# Patient Record
Sex: FEMALE | Race: WHITE | NOT HISPANIC OR LATINO | Employment: PART TIME | URBAN - METROPOLITAN AREA
[De-identification: names, ages, dates, MRNs, and addresses within clinical notes are randomized per-mention and may not be internally consistent; named-entity substitution may affect disease eponyms.]

---

## 2022-10-11 ENCOUNTER — APPOINTMENT (EMERGENCY)
Dept: CT IMAGING | Facility: HOSPITAL | Age: 67
DRG: 244 | End: 2022-10-11
Payer: MEDICARE

## 2022-10-11 ENCOUNTER — APPOINTMENT (OUTPATIENT)
Dept: RADIOLOGY | Facility: HOSPITAL | Age: 67
DRG: 244 | End: 2022-10-11
Payer: MEDICARE

## 2022-10-11 ENCOUNTER — HOSPITAL ENCOUNTER (EMERGENCY)
Facility: HOSPITAL | Age: 67
DRG: 244 | End: 2022-10-11
Attending: EMERGENCY MEDICINE
Payer: MEDICARE

## 2022-10-11 ENCOUNTER — HOSPITAL ENCOUNTER (INPATIENT)
Facility: HOSPITAL | Age: 67
LOS: 2 days | Discharge: HOME | DRG: 244 | End: 2022-10-13
Attending: STUDENT IN AN ORGANIZED HEALTH CARE EDUCATION/TRAINING PROGRAM | Admitting: STUDENT IN AN ORGANIZED HEALTH CARE EDUCATION/TRAINING PROGRAM
Payer: MEDICARE

## 2022-10-11 VITALS
HEART RATE: 28 BPM | OXYGEN SATURATION: 96 % | DIASTOLIC BLOOD PRESSURE: 67 MMHG | HEIGHT: 62 IN | BODY MASS INDEX: 37.2 KG/M2 | WEIGHT: 202.16 LBS | TEMPERATURE: 98.2 F | SYSTOLIC BLOOD PRESSURE: 147 MMHG | RESPIRATION RATE: 18 BRPM

## 2022-10-11 DIAGNOSIS — I44.2 HEART BLOCK AV COMPLETE (HCC): Primary | ICD-10-CM

## 2022-10-11 DIAGNOSIS — M25.562 LEFT KNEE PAIN: ICD-10-CM
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
## 2022-10-11 DIAGNOSIS — I44.2 COMPLETE HEART BLOCK, TRANSIENT (HCC): ICD-10-CM

## 2022-10-11 DIAGNOSIS — R07.81 RIB PAIN ON LEFT SIDE: ICD-10-CM

## 2022-10-11 DIAGNOSIS — I44.2 COMPLETE HEART BLOCK (HCC): Primary | ICD-10-CM

## 2022-10-11 PROBLEM — R74.01 TRANSAMINITIS: Status: ACTIVE | Noted: 2022-10-11

## 2022-10-11 PROBLEM — R79.89 ELEVATED LACTIC ACID LEVEL: Status: ACTIVE | Noted: 2022-10-11

## 2022-10-11 LAB
2HR DELTA HS TROPONIN: 2 NG/L
ALBUMIN SERPL BCP-MCNC: 3.5 G/DL (ref 3.5–5)
ALBUMIN SERPL BCP-MCNC: 4.2 G/DL (ref 3.5–5)
ALP SERPL-CCNC: 75 U/L (ref 46–116)
ALP SERPL-CCNC: 99 U/L (ref 46–116)
ALT SERPL W P-5'-P-CCNC: 106 U/L (ref 12–78)
ALT SERPL W P-5'-P-CCNC: 132 U/L (ref 12–78)
ANION GAP SERPL CALCULATED.3IONS-SCNC: 5 MMOL/L (ref 4–13)
ANION GAP SERPL CALCULATED.3IONS-SCNC: 9 MMOL/L (ref 4–13)
AST SERPL W P-5'-P-CCNC: 68 U/L (ref 5–45)
AST SERPL W P-5'-P-CCNC: 86 U/L (ref 5–45)
BASOPHILS # BLD AUTO: 0.05 THOUSANDS/ÂΜL (ref 0–0.1)
BASOPHILS # BLD AUTO: 0.06 THOUSANDS/ÂΜL (ref 0–0.1)
BASOPHILS NFR BLD AUTO: 1 % (ref 0–1)
BASOPHILS NFR BLD AUTO: 1 % (ref 0–1)
BILIRUB SERPL-MCNC: 0.89 MG/DL (ref 0.2–1)
BILIRUB SERPL-MCNC: 1.14 MG/DL (ref 0.2–1)
BUN SERPL-MCNC: 22 MG/DL (ref 5–25)
BUN SERPL-MCNC: 23 MG/DL (ref 5–25)
CA-I BLD-SCNC: 1.18 MMOL/L (ref 1.12–1.32)
CALCIUM SERPL-MCNC: 10.3 MG/DL (ref 8.3–10.1)
CALCIUM SERPL-MCNC: 9.1 MG/DL (ref 8.3–10.1)
CARDIAC TROPONIN I PNL SERPL HS: 28 NG/L
CARDIAC TROPONIN I PNL SERPL HS: 30 NG/L
CHLORIDE SERPL-SCNC: 102 MMOL/L (ref 96–108)
CHLORIDE SERPL-SCNC: 111 MMOL/L (ref 96–108)
CO2 SERPL-SCNC: 22 MMOL/L (ref 21–32)
CO2 SERPL-SCNC: 27 MMOL/L (ref 21–32)
CREAT SERPL-MCNC: 0.86 MG/DL (ref 0.6–1.3)
CREAT SERPL-MCNC: 0.95 MG/DL (ref 0.6–1.3)
EOSINOPHIL # BLD AUTO: 0.11 THOUSAND/ÂΜL (ref 0–0.61)
EOSINOPHIL # BLD AUTO: 0.15 THOUSAND/ÂΜL (ref 0–0.61)
EOSINOPHIL NFR BLD AUTO: 1 % (ref 0–6)
EOSINOPHIL NFR BLD AUTO: 2 % (ref 0–6)
ERYTHROCYTE [DISTWIDTH] IN BLOOD BY AUTOMATED COUNT: 13.3 % (ref 11.6–15.1)
ERYTHROCYTE [DISTWIDTH] IN BLOOD BY AUTOMATED COUNT: 13.4 % (ref 11.6–15.1)
GFR SERPL CREATININE-BSD FRML MDRD: 61 ML/MIN/1.73SQ M
GFR SERPL CREATININE-BSD FRML MDRD: 69 ML/MIN/1.73SQ M
GLUCOSE SERPL-MCNC: 150 MG/DL (ref 65–140)
GLUCOSE SERPL-MCNC: 184 MG/DL (ref 65–140)
HCT VFR BLD AUTO: 40.1 % (ref 34.8–46.1)
HCT VFR BLD AUTO: 40.7 % (ref 34.8–46.1)
HGB BLD-MCNC: 13.1 G/DL (ref 11.5–15.4)
HGB BLD-MCNC: 13.5 G/DL (ref 11.5–15.4)
IMM GRANULOCYTES # BLD AUTO: 0.03 THOUSAND/UL (ref 0–0.2)
IMM GRANULOCYTES # BLD AUTO: 0.03 THOUSAND/UL (ref 0–0.2)
IMM GRANULOCYTES NFR BLD AUTO: 0 % (ref 0–2)
IMM GRANULOCYTES NFR BLD AUTO: 1 % (ref 0–2)
LACTATE SERPL-SCNC: 1.9 MMOL/L (ref 0.5–2)
LACTATE SERPL-SCNC: 2.2 MMOL/L (ref 0.5–2)
LYMPHOCYTES # BLD AUTO: 2.02 THOUSANDS/ÂΜL (ref 0.6–4.47)
LYMPHOCYTES # BLD AUTO: 2.4 THOUSANDS/ÂΜL (ref 0.6–4.47)
LYMPHOCYTES NFR BLD AUTO: 23 % (ref 14–44)
LYMPHOCYTES NFR BLD AUTO: 37 % (ref 14–44)
MAGNESIUM SERPL-MCNC: 1.9 MG/DL (ref 1.6–2.6)
MCH RBC QN AUTO: 30 PG (ref 26.8–34.3)
MCH RBC QN AUTO: 30.1 PG (ref 26.8–34.3)
MCHC RBC AUTO-ENTMCNC: 32.7 G/DL (ref 31.4–37.4)
MCHC RBC AUTO-ENTMCNC: 33.2 G/DL (ref 31.4–37.4)
MCV RBC AUTO: 91 FL (ref 82–98)
MCV RBC AUTO: 92 FL (ref 82–98)
MONOCYTES # BLD AUTO: 0.53 THOUSAND/ÂΜL (ref 0.17–1.22)
MONOCYTES # BLD AUTO: 0.57 THOUSAND/ÂΜL (ref 0.17–1.22)
MONOCYTES NFR BLD AUTO: 6 % (ref 4–12)
MONOCYTES NFR BLD AUTO: 9 % (ref 4–12)
NEUTROPHILS # BLD AUTO: 3.29 THOUSANDS/ÂΜL (ref 1.85–7.62)
NEUTROPHILS # BLD AUTO: 5.95 THOUSANDS/ÂΜL (ref 1.85–7.62)
NEUTS SEG NFR BLD AUTO: 50 % (ref 43–75)
NEUTS SEG NFR BLD AUTO: 69 % (ref 43–75)
NRBC BLD AUTO-RTO: 0 /100 WBCS
NRBC BLD AUTO-RTO: 0 /100 WBCS
NT-PROBNP SERPL-MCNC: 75 PG/ML
PHOSPHATE SERPL-MCNC: 3.1 MG/DL (ref 2.3–4.1)
PLATELET # BLD AUTO: 199 THOUSANDS/UL (ref 149–390)
PLATELET # BLD AUTO: 200 THOUSANDS/UL (ref 149–390)
PMV BLD AUTO: 10.5 FL (ref 8.9–12.7)
PMV BLD AUTO: 10.6 FL (ref 8.9–12.7)
POTASSIUM SERPL-SCNC: 3.6 MMOL/L (ref 3.5–5.3)
POTASSIUM SERPL-SCNC: 4 MMOL/L (ref 3.5–5.3)
PROT SERPL-MCNC: 6.6 G/DL (ref 6.4–8.4)
PROT SERPL-MCNC: 8.3 G/DL (ref 6.4–8.4)
RBC # BLD AUTO: 4.37 MILLION/UL (ref 3.81–5.12)
RBC # BLD AUTO: 4.49 MILLION/UL (ref 3.81–5.12)
SODIUM SERPL-SCNC: 138 MMOL/L (ref 135–147)
SODIUM SERPL-SCNC: 138 MMOL/L (ref 135–147)
TSH SERPL DL<=0.05 MIU/L-ACNC: 3.69 UIU/ML (ref 0.45–4.5)
WBC # BLD AUTO: 6.5 THOUSAND/UL (ref 4.31–10.16)
WBC # BLD AUTO: 8.69 THOUSAND/UL (ref 4.31–10.16)

## 2022-10-11 PROCEDURE — 93005 ELECTROCARDIOGRAM TRACING: CPT

## 2022-10-11 PROCEDURE — 73564 X-RAY EXAM KNEE 4 OR MORE: CPT

## 2022-10-11 PROCEDURE — 84484 ASSAY OF TROPONIN QUANT: CPT | Performed by: PHYSICIAN ASSISTANT

## 2022-10-11 PROCEDURE — G1004 CDSM NDSC: HCPCS

## 2022-10-11 PROCEDURE — 84100 ASSAY OF PHOSPHORUS: CPT | Performed by: PHYSICIAN ASSISTANT

## 2022-10-11 PROCEDURE — 83735 ASSAY OF MAGNESIUM: CPT | Performed by: PHYSICIAN ASSISTANT

## 2022-10-11 PROCEDURE — 85025 COMPLETE CBC W/AUTO DIFF WBC: CPT

## 2022-10-11 PROCEDURE — 80053 COMPREHEN METABOLIC PANEL: CPT | Performed by: PHYSICIAN ASSISTANT

## 2022-10-11 PROCEDURE — 71101 X-RAY EXAM UNILAT RIBS/CHEST: CPT

## 2022-10-11 PROCEDURE — 80053 COMPREHEN METABOLIC PANEL: CPT

## 2022-10-11 PROCEDURE — 82330 ASSAY OF CALCIUM: CPT | Performed by: PHYSICIAN ASSISTANT

## 2022-10-11 PROCEDURE — 85025 COMPLETE CBC W/AUTO DIFF WBC: CPT | Performed by: PHYSICIAN ASSISTANT

## 2022-10-11 PROCEDURE — 99202 OFFICE O/P NEW SF 15 MIN: CPT | Performed by: STUDENT IN AN ORGANIZED HEALTH CARE EDUCATION/TRAINING PROGRAM

## 2022-10-11 PROCEDURE — 99285 EMERGENCY DEPT VISIT HI MDM: CPT

## 2022-10-11 PROCEDURE — 70450 CT HEAD/BRAIN W/O DYE: CPT

## 2022-10-11 PROCEDURE — 84443 ASSAY THYROID STIM HORMONE: CPT

## 2022-10-11 PROCEDURE — 99291 CRITICAL CARE FIRST HOUR: CPT | Performed by: PHYSICIAN ASSISTANT

## 2022-10-11 PROCEDURE — 36415 COLL VENOUS BLD VENIPUNCTURE: CPT

## 2022-10-11 PROCEDURE — 83605 ASSAY OF LACTIC ACID: CPT | Performed by: PHYSICIAN ASSISTANT

## 2022-10-11 PROCEDURE — 99291 CRITICAL CARE FIRST HOUR: CPT | Performed by: EMERGENCY MEDICINE

## 2022-10-11 PROCEDURE — 83880 ASSAY OF NATRIURETIC PEPTIDE: CPT | Performed by: PHYSICIAN ASSISTANT

## 2022-10-11 RX ORDER — LEVOTHYROXINE SODIUM 0.1 MG/1
100 TABLET ORAL DAILY
Status: DISCONTINUED | OUTPATIENT
Start: 2022-10-12 | End: 2022-10-13 | Stop reason: HOSPADM

## 2022-10-11 RX ORDER — PRAVASTATIN SODIUM 20 MG
20 TABLET ORAL
Status: DISCONTINUED | OUTPATIENT
Start: 2022-10-12 | End: 2022-10-13 | Stop reason: HOSPADM

## 2022-10-11 RX ORDER — VENLAFAXINE HYDROCHLORIDE 75 MG/1
1 CAPSULE, EXTENDED RELEASE ORAL DAILY
COMMUNITY

## 2022-10-11 RX ORDER — HEPARIN SODIUM 5000 [USP'U]/ML
5000 INJECTION, SOLUTION INTRAVENOUS; SUBCUTANEOUS EVERY 8 HOURS SCHEDULED
Status: DISCONTINUED | OUTPATIENT
Start: 2022-10-11 | End: 2022-10-13 | Stop reason: HOSPADM

## 2022-10-11 RX ORDER — TELMISARTAN 20 MG/1
20 TABLET ORAL DAILY
COMMUNITY

## 2022-10-11 RX ORDER — VENLAFAXINE HYDROCHLORIDE 75 MG/1
75 CAPSULE, EXTENDED RELEASE ORAL DAILY
Status: DISCONTINUED | OUTPATIENT
Start: 2022-10-12 | End: 2022-10-13 | Stop reason: HOSPADM

## 2022-10-11 RX ORDER — PRAVASTATIN SODIUM 20 MG
20 TABLET ORAL DAILY
COMMUNITY

## 2022-10-11 RX ORDER — PRAVASTATIN SODIUM 20 MG
20 TABLET ORAL DAILY
Status: DISCONTINUED | OUTPATIENT
Start: 2022-10-12 | End: 2022-10-11

## 2022-10-11 RX ORDER — LEVOTHYROXINE SODIUM 0.1 MG/1
100 TABLET ORAL DAILY
COMMUNITY

## 2022-10-11 RX ORDER — POTASSIUM CHLORIDE 14.9 MG/ML
20 INJECTION INTRAVENOUS ONCE
Status: COMPLETED | OUTPATIENT
Start: 2022-10-11 | End: 2022-10-11

## 2022-10-11 RX ADMIN — HEPARIN SODIUM 5000 UNITS: 5000 INJECTION INTRAVENOUS; SUBCUTANEOUS at 23:07

## 2022-10-11 RX ADMIN — POTASSIUM CHLORIDE 20 MEQ: 14.9 INJECTION, SOLUTION INTRAVENOUS at 20:21

## 2022-10-11 NOTE — ED PROVIDER NOTES
History  Chief Complaint   Patient presents with   • Fall     Pt arrives via ems for a fall tripping down the curb trying to get her balance tripping up the other curb hitting her head and her ribs and chest  +LOC  pt c/o rib and chest pain from the fall  Abrasions to the forehead  Pt remembers everything from the fall  43-year-old female patient with history of HTN, HLD, diabetes, thyroid presenting with fall onset today  Patient states that she lost her footing and she fell onto the curb  States she fell into her chest and her head hit the sidewalk  States episode of LOC for couple seconds  States woke up and is now having lightheadedness and nausea  Patient also states that she has chest pain and SOB secondary to the fall and shortness of breath  Patient also states pain to her left knee  Patient had echo and stress test 2 years ago  States since has been having intermittent lightheadedness for a year  States history of left bundle branch block  Denies fever, abd pain, v/d, urinary symptoms  Prior to Admission Medications   Prescriptions Last Dose Informant Patient Reported? Taking? Cyanocobalamin 1000 MCG/ML KIT 10/9/2022  Yes No   Sig: Take by mouth   levothyroxine 100 mcg tablet 10/11/2022 at 0800  Yes Yes   Sig: Take 100 mcg by mouth daily   pravastatin (PRAVACHOL) 20 mg tablet 10/10/2022 at 2100  Yes Yes   Sig: Take 20 mg by mouth daily   telmisartan (MICARDIS) 20 MG tablet 10/10/2022 at 2100  Yes Yes   Sig: Take 20 mg by mouth daily   venlafaxine (EFFEXOR-XR) 75 mg 24 hr capsule 10/11/2022 at 0800  Yes Yes   Sig: Take 1 capsule by mouth daily      Facility-Administered Medications: None       Past Medical History:   Diagnosis Date   • Diabetes mellitus ([facility] [address] )    • Disease of thyroid gland    • Hyperlipidemia    • Hypertension        Past Surgical History:   Procedure Laterality Date   • TONSILLECTOMY         History reviewed  No pertinent family history    I have reviewed and agree with the history as documented  E-Cigarette/Vaping     E-Cigarette/Vaping Substances     Social History     Tobacco Use   • Smoking status: Never Smoker   • Smokeless tobacco: Never Used        Review of Systems   Constitutional: Negative for chills, fatigue and fever  HENT: Negative for congestion, rhinorrhea and sore throat  Eyes: Negative for pain and visual disturbance  Respiratory: Positive for shortness of breath  Negative for cough and chest tightness  Cardiovascular: Positive for chest pain  Negative for leg swelling  Gastrointestinal: Positive for nausea  Negative for abdominal distention, abdominal pain, diarrhea and vomiting  Genitourinary: Negative for difficulty urinating and dysuria  Musculoskeletal: Negative for arthralgias, back pain and myalgias  Skin: Negative for rash and wound  Neurological: Positive for syncope and light-headedness  Negative for dizziness, weakness and headaches  All other systems reviewed and are negative  Physical Exam  ED Triage Vitals   Temperature Pulse Respirations Blood Pressure SpO2   10/11/22 1401 10/11/22 1401 10/11/22 1401 10/11/22 1401 10/11/22 1401   98 2 °F (36 8 °C) (!) 30 18 136/62 98 %      Temp src Heart Rate Source Patient Position - Orthostatic VS BP Location FiO2 (%)   -- 10/11/22 1531 10/11/22 1401 10/11/22 1401 --    Monitor Lying Left arm       Pain Score       10/11/22 1531       3             Orthostatic Vital Signs  Vitals:    10/11/22 1401 10/11/22 1531 10/11/22 1645   BP: 136/62 132/62 147/67   Pulse: (!) 30 (!) 26 (!) 28   Patient Position - Orthostatic VS: Lying Lying Lying       Physical Exam  Vitals reviewed  Constitutional:       Appearance: Normal appearance  HENT:      Head: Normocephalic  Comments: Abrasion to forehead     Nose: Nose normal       Mouth/Throat:      Mouth: Mucous membranes are moist       Pharynx: Oropharynx is clear  Eyes:      Extraocular Movements: Extraocular movements intact  Conjunctiva/sclera: Conjunctivae normal    Cardiovascular:      Rate and Rhythm: Regular rhythm  Bradycardia present  Pulses: Normal pulses  Heart sounds: Normal heart sounds  Pulmonary:      Effort: Pulmonary effort is normal       Breath sounds: Normal breath sounds  Chest:      Chest wall: Tenderness (left sided chest wall tenderness) present  Abdominal:      General: Bowel sounds are normal       Palpations: Abdomen is soft  Tenderness: There is no abdominal tenderness  Musculoskeletal:         General: Normal range of motion  Cervical back: Normal range of motion  Skin:     General: Skin is warm and dry  Neurological:      General: No focal deficit present  Mental Status: She is alert and oriented to person, place, and time  Mental status is at baseline  Cranial Nerves: No cranial nerve deficit  Sensory: No sensory deficit  Motor: No weakness  Coordination: Coordination normal          ED Medications  Medications - No data to display    Diagnostic Studies  Results Reviewed     Procedure Component Value Units Date/Time    TSH, 3rd generation with Free T4 reflex [[id number]]  (Normal) Collected: 10/11/22 1444    Lab Status: Final result Specimen: Blood from Arm, Right Updated: 10/11/22 1530     TSH 3RD GENERATON 3 686 uIU/mL     Narrative:      Patients undergoing fluorescein dye angiography may retain small amounts of fluorescein in the body for 48-72 hours post procedure  Samples containing fluorescein can produce falsely depressed TSH values  If the patient had this procedure,a specimen should be resubmitted post fluorescein clearance        Comprehensive metabolic panel [[id number]]  (Abnormal) Collected: 10/11/22 1444    Lab Status: Final result Specimen: Blood from Arm, Right Updated: 10/11/22 1513     Sodium 138 mmol/L      Potassium 4 0 mmol/L      Chloride 102 mmol/L      CO2 27 mmol/L      ANION GAP 9 mmol/L      BUN 23 mg/dL      Creatinine 0 95 mg/dL      Glucose 184 mg/dL      Calcium 10 3 mg/dL      AST 86 U/L       U/L      Alkaline Phosphatase 99 U/L      Total Protein 8 3 g/dL      Albumin 4 2 g/dL      Total Bilirubin 1 14 mg/dL      eGFR 61 ml/min/1 73sq m     Narrative:      [place] guidelines for Chronic Kidney Disease (CKD):   •  Stage 1 with normal or high GFR (GFR > 90 mL/min/1 73 square meters)  •  Stage 2 Mild CKD (GFR = 60-89 mL/min/1 73 square meters)  •  Stage 3A Moderate CKD (GFR = 45-59 mL/min/1 73 square meters)  •  Stage 3B Moderate CKD (GFR = 30-44 mL/min/1 73 square meters)  •  Stage 4 Severe CKD (GFR = 15-29 mL/min/1 73 square meters)  •  Stage 5 End Stage CKD (GFR <15 mL/min/1 73 square meters)  Note: GFR calculation is accurate only with a steady state creatinine    CBC and differential [[id number]] Collected: 10/11/22 1444    Lab Status: Final result Specimen: Blood from Arm, Right Updated: 10/11/22 1455     WBC 6 50 Thousand/uL      RBC 4 37 Million/uL      Hemoglobin 13 1 g/dL      Hematocrit 40 1 %      MCV 92 fL      MCH 30 0 pg      MCHC 32 7 g/dL      RDW 13 4 %      MPV 10 5 fL      Platelets 471 Thousands/uL      nRBC 0 /100 WBCs      Neutrophils Relative 50 %      Immat GRANS % 1 %      Lymphocytes Relative 37 %      Monocytes Relative 9 %      Eosinophils Relative 2 %      Basophils Relative 1 %      Neutrophils Absolute 3 29 Thousands/µL      Immature Grans Absolute 0 03 Thousand/uL      Lymphocytes Absolute 2 40 Thousands/µL      Monocytes Absolute 0 57 Thousand/µL      Eosinophils Absolute 0 15 Thousand/µL      Basophils Absolute 0 06 Thousands/µL                  CT head without contrast   Final Result by Serenity Ken MD (10/11 1542)      No acute intracranial abnormality                    Workstation performed: [id number]         XR ribs with pa chest min 3 views LEFT   ED Interpretation by Joseline Correa 24, DO (10/11 1704)   No PTX      XR knee 4+ vw left injury   ED Interpretation by DO Andres (10/11 1704)   No fracture            Procedures  Procedures      ED Course  ED Course as of 10/12/22 0754   Tue Oct 11, 2022   1657 Accepted for transfer to [facility]                             SBIRT 22yo+    Flowsheet Row Most Recent Value   SBIRT (23 yo +)    In order to provide better care to our patients, we are screening all of our patients for alcohol and drug use  Would it be okay to ask you these screening questions? No Filed at: 10/11/2022 1446                [facility]  Number of Diagnoses or Management Options  Complete heart block (HCC)  Fall  Left knee pain  Rib pain on left side  Diagnosis management comments: 74 y/o female patient presenting with fall today  States chest pain, SOB, secondary to fall  Patient also has head trauma and LOC  Labs show mildly elevated LFTs  EKG shows complete heart block, rate of 30  Unknown when initial onset of rhythm  CT head, xray ribs negative for acute fx  Spoke with cardiology, that spoke with EP at [place], agreed for transfer to [place] for pacemaker placement  BP stable, patient asymptomatic at this time from rhythm  Patient accepted for transfer under Dr Hermelindo Reyez to [place]  Patient agreed to transfer, and stable at time of transfer  Amount and/or Complexity of Data Reviewed  Clinical lab tests: ordered and reviewed  Tests in the radiology section of CPT®: ordered and reviewed        Disposition  Final diagnoses:   Complete heart block ([address] )   Rib pain on left side   Left knee pain   Fall     Time reflects when diagnosis was documented in both MDM as applicable and the Disposition within this note     Time User Action Codes Description Comment    10/11/2022  3:53 PM [email or web address] Sky[facility] Add [I44 2] Complete heart block ([address] )     10/11/2022  4:28 PM Adrianne St Add [R07 81] Rib pain on left side     10/11/2022  4:28 PM Erin Sargent Add [M25 562] Left knee pain     10/11/2022  4:28 PM Jerry Villafuerte [W19  [facility] ED Disposition     ED Disposition   Transfer to Another Facility    Condition   --    Date/Time   Tue Oct 11, 2022  5:25 PM    Comment   Maricarmen Marcial should be transferred out to [facility]              MD Documentation    Jakob Saez Most Recent Value   Patient Condition The patient has been stabilized such that within reasonable medical probability, no material deterioration of the patient condition or the condition of the unborn child(lisa) is likely to result from the transfer   Reason for Transfer Level of Care needed not available at this facility   Benefits of Transfer Specialized equipment and/or services available at the receiving facility (Include comment)________________________  [EP]   Risks of Transfer Potential for delay in receiving treatment, Potential deterioration of medical condition, Loss of IV, Increased discomfort during transfer, Possible worsening of condition or death during transfer   Accepting Physician Jen So [address]   Sending MD Dr Vance Leon   Provider Certification General risk, such as traffic hazards, adverse weather conditions, rough terrain or turbulence, possible failure of equipment (including vehicle or aircraft), or consequences of actions of persons outside the control of the transport personnel, Unanticipated needs of medical equipment and personnel during transport, Risk of worsening condition      RN Documentation    [address]      Follow-up Information    None         Discharge Medication List as of 10/11/2022  5:25 PM      CONTINUE these medications which have NOT CHANGED    Details   Cyanocobalamin 1000 MCG/ML KIT Take by mouth, Historical Med      levothyroxine 100 mcg tablet Take 100 mcg by mouth daily, Historical Med      pravastatin (PRAVACHOL) 20 mg tablet Take 20 mg by mouth daily, Historical Med telmisartan (MICARDIS) 20 MG tablet Take 20 mg by mouth daily, Historical Med      venlafaxine (EFFEXOR-XR) 75 mg 24 hr capsule Take 1 capsule by mouth daily, Historical Med           No discharge procedures on file  PDMP Review     None           ED Provider  Attending physically available and evaluated Dominga Chawla I managed the patient along with the ED Attending      Electronically Signed by         Roger Gonzales MD  10/12/22 8589

## 2022-10-11 NOTE — Clinical Note
The [facility] Dr Jose Eduardo Conway device was inserted  The leads were placed into the connector and visually verified to be in correct position  Injury current obtained

## 2022-10-11 NOTE — CONSULTS
Consultation - Electrophysiology  Fina Ortiz 75 y o  female MRN: [id number]  Unit/Bed#: [facility] 513-01 Encounter: [id number]          Inpatient consult to Electrophysiology     Date/Time 10/11/2022 6:20 PM     Performed by  Reggy Ganser, MD     Authorized by Garcia Alvarez PA-C            History of Present Illness   Physician Requesting Consult: Kareem Liu DO  Reason for Consult / Principal Problem: complete heart block      Assessment:  Complete heart block  Unclear if fall preceded syncope or vice versa, patient is symptomatic when sitting up  EKG with CHB, rate 30, stable blood pressure, normal renal functions  TSH wnl, electrolytes WNL, no hx of tick bites, no recent angina/chest pain, not on AV rosy blocking agents  Noted to have dizziness 1 year ago with cardiology considering loop recorder placement  Telemetry: sinus rhythm, rate 80  Per discussion with CCU, patient had a symptomatic CHB episode on transfer, rates 20-30    CAD  Noted in NM perfusion imaging and CCTA (see below)  Currently asymptomatic  Has had exertional dyspnea and had a exercise stress test and echo in August 2022 which were unremarkable per patient, reports unavailable    LBBB  History of chronic LBBB    Hypertension  Home meds: telmisartan 20 mg daily    Hyperlipidemia    DM2    Plan  1  Continue to monitor on telemetry  Patient has external pacer pads on  2  Keep NPO from midnight for PPM placement tomorrow  3  Avoid BB or other AV rosy blocking agenst  4  Monitor urine output, blood pressure  If patient decompensates overnight, will inform interventional cariology for TVP placement  5  Isoproterenol if hypotensive/unstable  6  Monitor electrolytes    Discussed plan with CCU DARYL Carrington  Will discuss above plan with on call attending Dr Aretha Ortiz      HPI: Fina Ortiz is a 75y o  year old female who has a history of hypertension, hyperlipidemia, chronic LBBB, CAD on NM stress test and CCTA, DM2, and JAMES and was following up at [facility] cardiology, last seen in August 2022  She has been transferred from [facility] for EP evaluation  She presents following a mechanical fall when walking  No preceding chest pain, palpitations, or shortness of breath  She denies pre syncopal episode prior to fall, however states that she hit her head and lost consciousness after the fall (she clearly remembers hitting her chest and head and then passing out)  She does report dizziness/lightheadedness in the past few weeks, notable on when standing from a seated position  No history of syncope, however during her last cardiology visit in March 2020, she has been having pre syncope with consideration for loop recorder placement  On admission to [facility] she was noted to be in a complete heart block, heart rate of 30, however blood pressure 130/60  Had stable electrolytes, mild hypercalcemia, mild transaminitis  TSH WNL  Normal creatinine  She was evaluated by cardiology and has been transferred to [facility] for ppm placement  Given patient is stable, it was decided to hold off on TVP placement  Currently patient is asymptomatic  Does have dizziness on sitting up  Prior cardiac work up  Rosemary Jones 32 test 10/2020: LBBB on EKG, medium,  Mild-mod intensity, reversib,e defect in the basalk to distal anteroseptal region  2D echo 7/2020: preserved EF 60-65%, LVH, mod diastolic dysfunction  CCTA 11/2020: mild CAD, CACS 349 (per chart review, report unavailable)  Patient had a exercise stress test and an echo in August 2022, which were unremarkable per patient, reports unavailable  EKG on presentation      Review of Systems   Constitutional: Negative for chills, decreased appetite, fever, malaise/fatigue, night sweats, weight gain and weight loss  HENT: Negative  Eyes: Negative  Cardiovascular: Positive for chest pain (musculoskeletal, following the fall) and dyspnea on exertion   Negative for leg swelling, near-syncope, orthopnea, palpitations, paroxysmal nocturnal dyspnea and syncope  Respiratory: Positive for shortness of breath and snoring  Negative for wheezing  Endocrine: Negative  Skin: Negative  Gastrointestinal: Negative  Genitourinary: Negative  Neurological: Positive for dizziness and light-headedness  Negative for headaches  All other systems reviewed and are negative  Historical Information   Past Medical History:   Diagnosis Date   • Diabetes mellitus ([facility] [address] )    • Disease of thyroid gland    • Hyperlipidemia    • Hypertension      Past Surgical History:   Procedure Laterality Date   • TONSILLECTOMY       Social History     Substance and Sexual Activity   Alcohol Use None    Comment: occ     Social History     Substance and Sexual Activity   Drug Use Not on file     Social History     Tobacco Use   Smoking Status Never Smoker   Smokeless Tobacco Never Used     Family History: No family history on file  Meds/Allergies   all current active meds have been reviewed, current meds:   Current Facility-Administered Medications   Medication Dose Route Frequency   • heparin (porcine) subcutaneous injection 5,000 Units  5,000 Units Subcutaneous Q8H [address]    and PTA meds:   Prior to Admission Medications   Prescriptions Last Dose Informant Patient Reported? Taking? Cyanocobalamin 1000 MCG/ML KIT   Yes No   Sig: Take by mouth   levothyroxine 100 mcg tablet   Yes No   Sig: Take 100 mcg by mouth daily   pravastatin (PRAVACHOL) 20 mg tablet   Yes No   Sig: Take 20 mg by mouth daily   telmisartan (MICARDIS) 20 MG tablet   Yes No   Sig: Take 20 mg by mouth daily   venlafaxine (EFFEXOR-XR) 75 mg 24 hr capsule   Yes No   Sig: Take 1 capsule by mouth daily      Facility-Administered Medications: None          No Known Allergies    Objective   Vitals: There were no vitals taken for this visit  , There is no height or weight on file to calculate BMI ,   Systolic (57NLL), SGC:977 , Min:132 , QZC:057     Diastolic (91CTW), QVU:70, Min:62, Max:67    No intake or output data in the 24 hours ending 10/11/22 1849    Weight (last 2 days)     None          Invasive Devices  Report    Peripheral Intravenous Line  Duration           Peripheral IV 10/11/22 Left Hand <1 day    Peripheral IV 10/11/22 Right Antecubital <1 day                    Physical Exam: /60   Pulse 84   Resp (!) 25   SpO2 91%     General Appearance:    Alert, cooperative, no distress, appears stated age   Head:    Normocephalic, without obvious abnormality, atraumatic   Eyes:    PERRL, conjunctiva/corneas clear, EOM's intact, fundi     benign, both eyes   Ears:    Normal TM's and external ear canals, both ears   Nose:   Nares normal, septum midline, mucosa normal, no drainage    or sinus tenderness   Throat:   Lips, mucosa, and tongue normal; teeth and gums normal   Neck:   Supple, symmetrical, trachea midline, no adenopathy;     thyroid:  no enlargement/tenderness/nodules; no carotid    bruit or JVD   Back:     Symmetric, no curvature, ROM normal, no CVA tenderness   Lungs:     Clear to auscultation bilaterally, respirations unlabored   Chest Wall:    No tenderness or deformity    Heart:    Regular rate and rhythm, S1 and S2 normal, no murmur, rub   or gallop   Breast Exam:    No tenderness, masses, or nipple abnormality   Abdomen:     Soft, non-tender, bowel sounds active all four quadrants,     no masses, no organomegaly   Extremities:   Extremities normal, atraumatic, no cyanosis or edema   Pulses:   2+ and symmetric all extremities   Skin:   Skin color, texture, turgor normal, no rashes or lesions       Laboratory Results:        CBC with diff:   Results from last 7 days   Lab Units 10/11/22  1444   WBC Thousand/uL 6 50   HEMOGLOBIN g/dL 13 1   HEMATOCRIT % 40 1   MCV fL 92   PLATELETS Thousands/uL 199   MCH pg 30 0   MCHC g/dL 32 7   RDW % 13 4   MPV fL 10 5   NRBC AUTO /100 WBCs 0         CMP:  Results from last 7 days   Lab Units 10/11/22  1444   POTASSIUM mmol/L 4  0   CHLORIDE mmol/L 102   CO2 mmol/L 27   BUN mg/dL 23   CREATININE mg/dL 0 95   CALCIUM mg/dL 10 3*   AST U/L 86*   ALT U/L 132*   ALK PHOS U/L 99   EGFR ml/min/1 73sq m 61         BMP:  Results from last 7 days   Lab Units 10/11/22  1444   POTASSIUM mmol/L 4 0   CHLORIDE mmol/L 102   CO2 mmol/L 27   BUN mg/dL 23   CREATININE mg/dL 0 95   CALCIUM mg/dL 10 3*       BNP:  No results for input(s): BNP in the last 72 hours  Magnesium:       Coags:       TSH:       Hemoglobin A1C       Lipid Profile:         Cardiac testing:   No results found for this or any previous visit  No results found for this or any previous visit  No results found for this or any previous visit  No results found for this or any previous visit  Imaging: I have personally reviewed pertinent reports  CT head without contrast    Result Date: 10/11/2022  Narrative: CT BRAIN - WITHOUT CONTRAST INDICATION:   Head trauma, moderate-severe head injury  COMPARISON:  None  TECHNIQUE:  CT examination of the brain was performed  In addition to axial images, sagittal and coronal 2D reformatted images were created and submitted for interpretation  Radiation dose length product (DLP) for this visit:  816 mGy-cm   This examination, like all CT scans performed in the [facility], was performed utilizing techniques to minimize radiation dose exposure, including the use of iterative reconstruction and automated exposure control  IMAGE QUALITY:  Diagnostic  FINDINGS: PARENCHYMA: Decreased attenuation is noted in periventricular and subcortical white matter demonstrating an appearance that is statistically most likely to represent mild microangiopathic change  No CT signs of acute infarction  No intracranial mass, mass effect or midline shift  No acute parenchymal hemorrhage  VENTRICLES AND EXTRA-AXIAL SPACES:  Normal for the patient's age  VISUALIZED ORBITS AND PARANASAL SINUSES:  Unremarkable   CALVARIUM AND EXTRACRANIAL SOFT TISSUES:  Normal      Impression: No acute intracranial abnormality   Workstation performed: [id number]       EKG reviewed personally: [facility] rate 30  Telemetry reviewed personally: sinus rhythm, LBBB, rate 80      Code Status: Level 1 - Full Code

## 2022-10-11 NOTE — CONSULTS
Reason for Consult / Principal Problem:[facility]    Physician Requesting Consult:  Joseline Bess,     Cardiologist: Lindsey Galindo MD        Consult to cardiology  Consult performed by: Gina Clemens DO  Consult ordered by: Joseline Bess, DO        Assessment and Plan      Current Problem List   Active Problems:    * No active hospital problems  *    Assessment/Plan:    1  Complete heart block - as noted on EKG today  Patient with episodes of syncope, appears that she may have passed out after hitting her head  She was symptomatic for about 2 weeks prior  No chest pain currently other than some mild chest wall pain  Does admit to some mild shortness of breath  + dizziness when standing  No dizziness at rest  CT head negative for acute abnormalities  Did discuss with EP who advised to contact cath lab  · Continue to monitor on telemetry  · Obtain echo  · Obtain TSH  · Electrolytes normal    · No evidence of reversible cause will need PM - will discuss with lab regarding possible temp pacer  · Pacer pads at bedside  · Avoid any AV rosy blocking agents  · Monitor urine output if decline in UOP contact cardiology  · Monitor end organ function  2   CAD - by CTA reported with positive myocardial perfusion scan  · Cont  Patients home statin  · Hold telmisartan for now given CHB  · Not on ASA outpatient - unclear why given CAD - however will defer to patients outpatient cardiologist at this point given acute event and would not introduce ASA in patient who needs pacemaker  3  LBBB  ·  Since around age 35 had a myocardial perfusion scan that was positive but a CTA that showed moderate disease  Subjective     CC: [facility]  HPI: Tonya Collet 75y o  year old female with a PMH of type II DM , CAD as determined by CTA on CCTA 2011, LBBB history of reversible anterior defect on nuclear stress - subsequently had a CTA done in 11/2020 that showed CCTA 11/2020: mild CAD, CACS 349   History of syncope as well, hypertensive heart disease with echo in July 20202 that showed LVH and grade 2 DD, mild MR, JAMES, hypothyroidism  The patient tripped on the curb today and hit her chest  She did have loss of consciousness  She did report lightheadedness, nausea, and left rib tenderness with SOB  She was found to be in complete heart block on EKG here  She reportedly also has a history of lightheadedness missy standing for the same weak  She currently is maintaining her blood pressure  Labs are notable for a calcium of 10 3, t bili and AST and ALT that are mildly elevated  Her creatine is normal      The patient reports to me for the last 2 weeks she has felt off when standing  She has no fever  No lyme disease exposure or tick exposure she has noted  No chest pain on exertion  Mild shortness of breath  She reports she did not pass out before hitting her head - but it was when she hit her head that she passed out  Currently has no shortness of breath  Does have some chest wall soreness  No recent fevers or illness  She does once in awhile take walks in the woods  She does report dizziness when sitting up but is asymptomatic at rest      Telemetry: no data available yet is off regular monitor  EKG: Complete heart block with escape rhythm of 128  ECHO: Needs repeated - previous preserved EF grade II DD  Stress test: myocardial perfusion scan previously that showed anterior defect CCTA that showed mild CAD and elevated coronary artery calcium score  Cardiac Catheterization: none  Device Interrogation: none    CHEST X-RAY: pending  CT scan: pending  Labs: see above, TSH pending  Family History: History reviewed  No pertinent family history    Historical Information   Past Medical History:   Diagnosis Date   • Diabetes mellitus ([facility] [address] )    • Disease of thyroid gland    • Hyperlipidemia    • Hypertension      Past Surgical History:   Procedure Laterality Date   • TONSILLECTOMY       Social History   Social History     Substance and Sexual Activity   Alcohol Use None    Comment: occ     Social History     Substance and Sexual Activity   Drug Use Not on file     Social History     Tobacco Use   Smoking Status Never Smoker   Smokeless Tobacco Never Used     Family History: History reviewed  No pertinent family history  Review of Systems:  Review of Systems   Constitutional: Negative for fever  Respiratory: Negative for chest tightness  Cardiovascular: Negative for chest pain  Gastrointestinal: Negative for abdominal distention  Neurological: Positive for dizziness  Psychiatric/Behavioral: Negative for agitation  Scheduled Meds:  Continuous Infusions:No current facility-administered medications for this encounter  PRN Meds:   all current active meds have been reviewed    No Known Allergies    Objective   Vitals: Temp (24hrs), Av 2 °F (36 8 °C), Min:98 2 °F (36 8 °C), Max:98 2 °F (36 8 °C)  Current: Temperature: 98 2 °F (36 8 °C)  Patient Vitals for the past 24 hrs:   BP Temp Pulse Resp SpO2   10/11/22 1401 136/62 98 2 °F (36 8 °C) (!) 30 18 98 %    There is no height or weight on file to calculate BMI  Orthostatic Blood Pressures    Flowsheet Row Most Recent Value   Blood Pressure 136/62 filed at 10/11/2022 1401   Patient Position - Orthostatic VS Lying filed at 10/11/2022 1401              Invasive Devices  Report    Peripheral Intravenous Line  Duration           Peripheral IV 10/11/22 Left Hand <1 day    Peripheral IV 10/11/22 Right Antecubital <1 day                Physical Exam:  Physical Exam  Constitutional:       Appearance: Normal appearance  Cardiovascular:      Rate and Rhythm: Bradycardia present  Rhythm irregular  Heart sounds: No murmur heard  Pulmonary:      Effort: Pulmonary effort is normal  No respiratory distress  Abdominal:      General: Abdomen is flat  There is no distension  Neurological:      Mental Status: She is alert     Psychiatric: Mood and Affect: Mood normal                Lab Results:         Invalid input(s):  EOSPCT       Invalid input(s): LABALBU              No results found for: PHART, RFP6VVD, PO2ART, BIR8SJM, U0ISVGJY, BEART, SOURCE  No components found for: HIV1X2  No results found for: HAV, HEPAIGM, HEPBIGM, HEPBCAB, HBEAG, HEPCAB  No results found for: SPEP, UPEP No results found for: HGBA1C  No results found for: CHOL No results found for: HDL No results found for: LDLCALC No results found for: TRIG  No components found for: PROCAL          Imaging: I have personally reviewed pertinent reports

## 2022-10-11 NOTE — EMTALA/ACUTE CARE TRANSFER
[facility] [address]  [address] [id number]  Dept: [phone]      EMTALA TRANSFER CONSENT    NAME Hector Gross                                         1955                              MRN [id number]    I have been informed of my rights regarding examination, treatment, and transfer   by Dr Mirian Foreman DO    Benefits: Specialized equipment and/or services available at the receiving facility (Include comment)________________________ (EP)    Risks: Potential for delay in receiving treatment, Potential deterioration of medical condition, Loss of IV, Increased discomfort during transfer, Possible worsening of condition or death during transfer      Transfer Request   I acknowledge that my medical condition has been evaluated and explained to me by the emergency department physician or other qualified medical person and/or my attending physician who has recommended and offered to me further medical examination and treatment  I understand the Hospital's obligation with respect to the treatment and stabilization of my emergency medical condition  I nevertheless request to be transferred  I release the Hospital, the doctor, and any other persons caring for me from all responsibility or liability for any injury or ill effects that may result from my transfer and agree to accept all responsibility for the consequences of my choice to transfer, rather than receive stabilizing treatment at the Hospital  I understand that because the transfer is my request, my insurance may not provide reimbursement for the services  The Hospital will assist and direct me and my family in how to make arrangements for transfer, but the hospital is not liable for any fees charged by the transport service    In spite of this understanding, I refuse to consent to further medical examination and treatment which has been offered to me, and request transfer to [address] Name, City & State : [facility], [facility]  I authorize the performance of emergency medical procedures and treatments upon me in both transit and upon arrival at the receiving facility  Additionally, I authorize the release of any and all medical records to the receiving facility and request they be transported with me, if possible  I authorize the performance of emergency medical procedures and treatments upon me in both transit and upon arrival at the receiving facility  Additionally, I authorize the release of any and all medical records to the receiving facility and request they be transported with me, if possible  I understand that the safest mode of transportation during a medical emergency is an ambulance and that the Hospital advocates the use of this mode of transport  Risks of traveling to the receiving facility by car, including absence of medical control, life sustaining equipment, such as oxygen, and medical personnel has been explained to me and I fully understand them  (SKIP CORRECT BOX BELOW)  [  ]  I consent to the stated transfer and to be transported by ambulance/helicopter  [  ]  I consent to the stated transfer, but refuse transportation by ambulance and accept full responsibility for my transportation by car  I understand the risks of non-ambulance transfers and I exonerate the Hospital and its staff from any deterioration in my condition that results from this refusal     X___________________________________________    DATE  10/11/22  TIME________  Signature of patient or legally responsible individual signing on patient behalf           RELATIONSHIP TO PATIENT_________________________          Provider Certification    NAME Cornelio Brewer                                        [facility] 1955                              MRN [id number]    A medical screening exam was performed on the above named patient    Based on the examination:    Condition Necessitating Transfer The primary encounter diagnosis was Complete heart block ([address] )  Diagnoses of Rib pain on left side, Left knee pain, and Fall were also pertinent to this visit  Patient Condition: The patient has been stabilized such that within reasonable medical probability, no material deterioration of the patient condition or the condition of the unborn child(lisa) is likely to result from the transfer    Reason for Transfer: Level of Care needed not available at this facility    Transfer Requirements: [address] [facility], [facility]   · Space available and qualified personnel available for treatment as acknowledged by    · Agreed to accept transfer and to provide appropriate medical treatment as acknowledged by       Dr Merlin Gray  · Appropriate medical records of the examination and treatment of the patient are provided at the time of transfer   [address] _______  · Transfer will be performed by qualified personnel from    and appropriate transfer equipment as required, including the use of necessary and appropriate life support measures      Provider Certification: I have examined the patient and explained the following risks and benefits of being transferred/refusing transfer to the patient/family:  General risk, such as traffic hazards, adverse weather conditions, rough terrain or turbulence, possible failure of equipment (including vehicle or aircraft), or consequences of actions of persons outside the control of the transport personnel, Unanticipated needs of medical equipment and personnel during transport, Risk of worsening condition      Based on these reasonable risks and benefits to the patient and/or the unborn child(lisa), and based upon the information available at the time of the patient’s examination, I certify that the medical benefits reasonably to be expected from the provision of appropriate medical treatments at another medical facility outweigh the increasing risks, if any, to the individual’s medical condition, and in the case of labor to the unborn child, from effecting the transfer      X____________________________________________ DATE 10/11/22        TIME_______      ORIGINAL - SEND TO MEDICAL RECORDS   COPY - SEND WITH PATIENT DURING TRANSFER

## 2022-10-11 NOTE — ED ATTENDING ATTESTATION
10/11/2022  IAdrianne DO, saw and evaluated the patient  I have discussed the patient with the resident/non-physician practitioner and agree with the resident's/non-physician practitioner's findings, Plan of Care, and MDM as documented in the resident's/non-physician practitioner's note, except where noted  All available labs and Radiology studies were reviewed  I was present for key portions of any procedure(s) performed by the resident/non-physician practitioner and I was immediately available to provide assistance  At this point I agree with the current assessment done in the Emergency Department  I have conducted an independent evaluation of this patient a history and physical is as follows:    ED Course     75 y o  F p/w mechanical fall  Tripped on curb and struck chest and head on curb   + LOC  Having lightheadedness, nausea, left rib tenderness with SOB, and left knee pain  Pt found to be in complete heart block on EKG here  Pt notes she has been having episodes of lightheadedness upon standing over the past few weeks  No h/o same  Plan: Labs, CT head, CXR, xray knee, consult cards      Critical Care Time  CriticalCare Time  Performed by: Joseline Bess DO  Authorized by: Joseline Bess DO     Critical care provider statement:     Critical care time (minutes):  45    Critical care time was exclusive of:  Separately billable procedures and treating other patients and teaching time    Critical care was necessary to treat or prevent imminent or life-threatening deterioration of the following conditions:  Cardiac failure    Critical care was time spent personally by me on the following activities:  Blood draw for specimens, obtaining history from patient or surrogate, development of treatment plan with patient or surrogate, discussions with consultants, evaluation of patient's response to treatment, examination of patient, ordering and performing treatments and interventions, ordering and review of laboratory studies, ordering and review of radiographic studies and re-evaluation of patient's condition    I assumed direction of critical care for this patient from another provider in my specialty: no    Comments:      Pt with complete heart block requiring urgent transfer to B for pacemaker

## 2022-10-11 NOTE — Clinical Note
Site (pad location): anterior thigh  Laterality: right  Grounding pad site assessment: skin integrity intact

## 2022-10-12 ENCOUNTER — APPOINTMENT (OUTPATIENT)
Dept: RADIOLOGY | Facility: HOSPITAL | Age: 67
DRG: 244 | End: 2022-10-12
Payer: MEDICARE

## 2022-10-12 ENCOUNTER — APPOINTMENT (INPATIENT)
Dept: NON INVASIVE DIAGNOSTICS | Facility: HOSPITAL | Age: 67
DRG: 244 | End: 2022-10-12
Payer: MEDICARE

## 2022-10-12 ENCOUNTER — EPISODE CHANGES (OUTPATIENT)
Dept: CASE MANAGEMENT | Facility: OTHER | Age: 67
End: 2022-10-12

## 2022-10-12 PROBLEM — G47.30 SLEEP APNEA: Status: ACTIVE | Noted: 2022-10-12

## 2022-10-12 LAB
4HR DELTA HS TROPONIN: -3 NG/L
ANION GAP SERPL CALCULATED.3IONS-SCNC: 6 MMOL/L (ref 4–13)
AORTIC ROOT: 2.7 CM
APICAL FOUR CHAMBER EJECTION FRACTION: 45 %
ASCENDING AORTA: 2.8 CM
ATRIAL RATE: 105 BPM
ATRIAL RATE: 66 BPM
ATRIAL RATE: 75 BPM
BASOPHILS # BLD AUTO: 0.05 THOUSANDS/ÂΜL (ref 0–0.1)
BASOPHILS NFR BLD AUTO: 1 % (ref 0–1)
BUN SERPL-MCNC: 24 MG/DL (ref 5–25)
CALCIUM SERPL-MCNC: 9.7 MG/DL (ref 8.3–10.1)
CARDIAC TROPONIN I PNL SERPL HS: 25 NG/L
CHLORIDE SERPL-SCNC: 107 MMOL/L (ref 96–108)
CO2 SERPL-SCNC: 25 MMOL/L (ref 21–32)
CREAT SERPL-MCNC: 0.76 MG/DL (ref 0.6–1.3)
E WAVE DECELERATION TIME: 273 MS
EOSINOPHIL # BLD AUTO: 0.11 THOUSAND/ÂΜL (ref 0–0.61)
EOSINOPHIL NFR BLD AUTO: 2 % (ref 0–6)
ERYTHROCYTE [DISTWIDTH] IN BLOOD BY AUTOMATED COUNT: 13.4 % (ref 11.6–15.1)
FRACTIONAL SHORTENING: 19 (ref 28–44)
GFR SERPL CREATININE-BSD FRML MDRD: 80 ML/MIN/1.73SQ M
GLUCOSE SERPL-MCNC: 114 MG/DL (ref 65–140)
GLUCOSE SERPL-MCNC: 115 MG/DL (ref 65–140)
GLUCOSE SERPL-MCNC: 142 MG/DL (ref 65–140)
GLUCOSE SERPL-MCNC: 160 MG/DL (ref 65–140)
GLUCOSE SERPL-MCNC: 165 MG/DL (ref 65–140)
HCT VFR BLD AUTO: 39 % (ref 34.8–46.1)
HGB BLD-MCNC: 12.9 G/DL (ref 11.5–15.4)
IMM GRANULOCYTES # BLD AUTO: 0.02 THOUSAND/UL (ref 0–0.2)
IMM GRANULOCYTES NFR BLD AUTO: 0 % (ref 0–2)
INTERVENTRICULAR SEPTUM IN DIASTOLE (PARASTERNAL SHORT AXIS VIEW): 1.9 CM
INTERVENTRICULAR SEPTUM: 1.9 CM (ref 0.6–1.1)
LAAS-AP2: 15 CM2
LAAS-AP4: 14.1 CM2
LEFT ATRIUM SIZE: 2.7 CM
LEFT INTERNAL DIMENSION IN SYSTOLE: 2.6 CM (ref 2.1–4)
LEFT VENTRICLE DIASTOLIC VOLUME (MOD BIPLANE): 61 ML
LEFT VENTRICLE SYSTOLIC VOLUME (MOD BIPLANE): 30 ML
LEFT VENTRICULAR INTERNAL DIMENSION IN DIASTOLE: 3.2 CM (ref 3.5–6)
LEFT VENTRICULAR POSTERIOR WALL IN END DIASTOLE: 1.4 CM
LEFT VENTRICULAR STROKE VOLUME: 16 ML
LV EF: 51 %
LVSV (TEICH): 16 ML
LYMPHOCYTES # BLD AUTO: 2.38 THOUSANDS/ÂΜL (ref 0.6–4.47)
LYMPHOCYTES NFR BLD AUTO: 35 % (ref 14–44)
MAGNESIUM SERPL-MCNC: 2.1 MG/DL (ref 1.6–2.6)
MCH RBC QN AUTO: 30.5 PG (ref 26.8–34.3)
MCHC RBC AUTO-ENTMCNC: 33.1 G/DL (ref 31.4–37.4)
MCV RBC AUTO: 92 FL (ref 82–98)
MONOCYTES # BLD AUTO: 0.47 THOUSAND/ÂΜL (ref 0.17–1.22)
MONOCYTES NFR BLD AUTO: 7 % (ref 4–12)
MV E'TISSUE VEL-SEP: 5 CM/S
MV PEAK A VEL: 1.17 M/S
MV PEAK E VEL: 70 CM/S
MV STENOSIS PRESSURE HALF TIME: 79 MS
MV VALVE AREA P 1/2 METHOD: 2.78
NEUTROPHILS # BLD AUTO: 3.71 THOUSANDS/ÂΜL (ref 1.85–7.62)
NEUTS SEG NFR BLD AUTO: 55 % (ref 43–75)
NRBC BLD AUTO-RTO: 0 /100 WBCS
P AXIS: 53 DEGREES
P AXIS: 55 DEGREES
P AXIS: 62 DEGREES
PHOSPHATE SERPL-MCNC: 3.6 MG/DL (ref 2.3–4.1)
PLATELET # BLD AUTO: 185 THOUSANDS/UL (ref 149–390)
PMV BLD AUTO: 10.8 FL (ref 8.9–12.7)
POTASSIUM SERPL-SCNC: 3.9 MMOL/L (ref 3.5–5.3)
PR INTERVAL: 142 MS
PR INTERVAL: 144 MS
QRS AXIS: -33 DEGREES
QRS AXIS: -35 DEGREES
QRS AXIS: 43 DEGREES
QRSD INTERVAL: 128 MS
QRSD INTERVAL: 148 MS
QRSD INTERVAL: 150 MS
QT INTERVAL: 434 MS
QT INTERVAL: 440 MS
QT INTERVAL: 614 MS
QTC INTERVAL: 433 MS
QTC INTERVAL: 461 MS
QTC INTERVAL: 484 MS
RBC # BLD AUTO: 4.23 MILLION/UL (ref 3.81–5.12)
RIGHT ATRIAL 2D VOLUME: 27 ML
RIGHT ATRIUM AREA SYSTOLE A4C: 11.8 CM2
RIGHT VENTRICLE ID DIMENSION: 2.8 CM
SL CV LEFT ATRIUM LENGTH A2C: 4.4 CM
SL CV LV EF: 55
SL CV PED ECHO LEFT VENTRICLE DIASTOLIC VOLUME (MOD BIPLANE) 2D: 40 ML
SL CV PED ECHO LEFT VENTRICLE SYSTOLIC VOLUME (MOD BIPLANE) 2D: 24 ML
SODIUM SERPL-SCNC: 138 MMOL/L (ref 135–147)
T WAVE AXIS: 127 DEGREES
T WAVE AXIS: 131 DEGREES
T WAVE AXIS: 69 DEGREES
VENTRICULAR RATE: 30 BPM
VENTRICULAR RATE: 66 BPM
VENTRICULAR RATE: 75 BPM
WBC # BLD AUTO: 6.74 THOUSAND/UL (ref 4.31–10.16)

## 2022-10-12 PROCEDURE — 0JH606Z INSERTION OF PACEMAKER, DUAL CHAMBER INTO CHEST SUBCUTANEOUS TISSUE AND FASCIA, OPEN APPROACH: ICD-10-PCS | Performed by: INTERNAL MEDICINE

## 2022-10-12 PROCEDURE — C1769 GUIDE WIRE: HCPCS | Performed by: INTERNAL MEDICINE

## 2022-10-12 PROCEDURE — 80048 BASIC METABOLIC PNL TOTAL CA: CPT | Performed by: PHYSICIAN ASSISTANT

## 2022-10-12 PROCEDURE — 99232 SBSQ HOSP IP/OBS MODERATE 35: CPT | Performed by: INTERNAL MEDICINE

## 2022-10-12 PROCEDURE — 93010 ELECTROCARDIOGRAM REPORT: CPT | Performed by: INTERNAL MEDICINE

## 2022-10-12 PROCEDURE — 33208 INSRT HEART PM ATRIAL & VENT: CPT | Performed by: INTERNAL MEDICINE

## 2022-10-12 PROCEDURE — 85025 COMPLETE CBC W/AUTO DIFF WBC: CPT | Performed by: PHYSICIAN ASSISTANT

## 2022-10-12 PROCEDURE — C1892 INTRO/SHEATH,FIXED,PEEL-AWAY: HCPCS | Performed by: INTERNAL MEDICINE

## 2022-10-12 PROCEDURE — 93306 TTE W/DOPPLER COMPLETE: CPT | Performed by: INTERNAL MEDICINE

## 2022-10-12 PROCEDURE — 84100 ASSAY OF PHOSPHORUS: CPT | Performed by: PHYSICIAN ASSISTANT

## 2022-10-12 PROCEDURE — 93306 TTE W/DOPPLER COMPLETE: CPT

## 2022-10-12 PROCEDURE — 82948 REAGENT STRIP/BLOOD GLUCOSE: CPT

## 2022-10-12 PROCEDURE — 83735 ASSAY OF MAGNESIUM: CPT | Performed by: PHYSICIAN ASSISTANT

## 2022-10-12 PROCEDURE — C1785 PMKR, DUAL, RATE-RESP: HCPCS | Performed by: INTERNAL MEDICINE

## 2022-10-12 PROCEDURE — 99223 1ST HOSP IP/OBS HIGH 75: CPT | Performed by: INTERNAL MEDICINE

## 2022-10-12 PROCEDURE — 99291 CRITICAL CARE FIRST HOUR: CPT | Performed by: PHYSICIAN ASSISTANT

## 2022-10-12 PROCEDURE — C1898 LEAD, PMKR, OTHER THAN TRANS: HCPCS | Performed by: INTERNAL MEDICINE

## 2022-10-12 PROCEDURE — 71045 X-RAY EXAM CHEST 1 VIEW: CPT

## 2022-10-12 PROCEDURE — 02H63JZ INSERTION OF PACEMAKER LEAD INTO RIGHT ATRIUM, PERCUTANEOUS APPROACH: ICD-10-PCS | Performed by: INTERNAL MEDICINE

## 2022-10-12 PROCEDURE — 93005 ELECTROCARDIOGRAM TRACING: CPT

## 2022-10-12 PROCEDURE — 3E0132A INTRODUCTION OF ANTI-INFECTIVE ENVELOPE INTO SUBCUTANEOUS TISSUE, PERCUTANEOUS APPROACH: ICD-10-PCS | Performed by: INTERNAL MEDICINE

## 2022-10-12 PROCEDURE — 02HK3JZ INSERTION OF PACEMAKER LEAD INTO RIGHT VENTRICLE, PERCUTANEOUS APPROACH: ICD-10-PCS | Performed by: INTERNAL MEDICINE

## 2022-10-12 DEVICE — IPG W1DR01 AZURE XT DR MRI USA
Type: IMPLANTABLE DEVICE | Site: CHEST | Status: FUNCTIONAL
Brand: AZURE™ XT DR MRI SURESCAN™

## 2022-10-12 DEVICE — LEAD 457453 MRI US BI RCMCRD MVC
Type: IMPLANTABLE DEVICE | Site: HEART | Status: FUNCTIONAL
Brand: CAPSURE SENSE MRI™ SURESCAN™

## 2022-10-12 DEVICE — ENVELOPE CMRM6122 ABSORB MED MR
Type: IMPLANTABLE DEVICE | Site: CHEST | Status: FUNCTIONAL
Brand: TYRX™

## 2022-10-12 DEVICE — LEAD 383069 MRI US
Type: IMPLANTABLE DEVICE | Site: HEART | Status: FUNCTIONAL
Brand: SELECTSECURE™ MRI SURESCAN™

## 2022-10-12 RX ORDER — CEFAZOLIN SODIUM 2 G/50ML
2000 SOLUTION INTRAVENOUS ONCE
Status: COMPLETED | OUTPATIENT
Start: 2022-10-12 | End: 2022-10-12

## 2022-10-12 RX ORDER — LIDOCAINE HYDROCHLORIDE 10 MG/ML
INJECTION, SOLUTION EPIDURAL; INFILTRATION; INTRACAUDAL; PERINEURAL AS NEEDED
Status: DISCONTINUED | OUTPATIENT
Start: 2022-10-12 | End: 2022-10-12 | Stop reason: HOSPADM

## 2022-10-12 RX ORDER — ACETAMINOPHEN 325 MG/1
650 TABLET ORAL EVERY 6 HOURS PRN
Status: DISCONTINUED | OUTPATIENT
Start: 2022-10-12 | End: 2022-10-13

## 2022-10-12 RX ORDER — FENTANYL CITRATE 50 UG/ML
INJECTION, SOLUTION INTRAMUSCULAR; INTRAVENOUS AS NEEDED
Status: DISCONTINUED | OUTPATIENT
Start: 2022-10-12 | End: 2022-10-12

## 2022-10-12 RX ORDER — ADENOSINE 3 MG/ML
INJECTION INTRAVENOUS AS NEEDED
Status: DISCONTINUED | OUTPATIENT
Start: 2022-10-12 | End: 2022-10-12 | Stop reason: HOSPADM

## 2022-10-12 RX ORDER — HYDRALAZINE HYDROCHLORIDE 20 MG/ML
5 INJECTION INTRAMUSCULAR; INTRAVENOUS EVERY 6 HOURS PRN
Status: DISCONTINUED | OUTPATIENT
Start: 2022-10-12 | End: 2022-10-13 | Stop reason: HOSPADM

## 2022-10-12 RX ORDER — SODIUM CHLORIDE 9 MG/ML
INJECTION, SOLUTION INTRAVENOUS CONTINUOUS PRN
Status: DISCONTINUED | OUTPATIENT
Start: 2022-10-12 | End: 2022-10-12

## 2022-10-12 RX ORDER — PROPOFOL 10 MG/ML
INJECTION, EMULSION INTRAVENOUS AS NEEDED
Status: DISCONTINUED | OUTPATIENT
Start: 2022-10-12 | End: 2022-10-12

## 2022-10-12 RX ORDER — INSULIN LISPRO 100 [IU]/ML
1-6 INJECTION, SOLUTION INTRAVENOUS; SUBCUTANEOUS EVERY 6 HOURS SCHEDULED
Status: DISCONTINUED | OUTPATIENT
Start: 2022-10-12 | End: 2022-10-13 | Stop reason: HOSPADM

## 2022-10-12 RX ORDER — PROPOFOL 10 MG/ML
INJECTION, EMULSION INTRAVENOUS CONTINUOUS PRN
Status: DISCONTINUED | OUTPATIENT
Start: 2022-10-12 | End: 2022-10-12

## 2022-10-12 RX ORDER — GENTAMICIN SULFATE 40 MG/ML
INJECTION, SOLUTION INTRAMUSCULAR; INTRAVENOUS AS NEEDED
Status: DISCONTINUED | OUTPATIENT
Start: 2022-10-12 | End: 2022-10-12 | Stop reason: HOSPADM

## 2022-10-12 RX ADMIN — PROPOFOL 10 MG: 10 INJECTION, EMULSION INTRAVENOUS at 14:54

## 2022-10-12 RX ADMIN — SODIUM CHLORIDE: 0.9 INJECTION, SOLUTION INTRAVENOUS at 14:22

## 2022-10-12 RX ADMIN — CEFAZOLIN SODIUM 2000 MG: 2 SOLUTION INTRAVENOUS at 14:34

## 2022-10-12 RX ADMIN — PHENYLEPHRINE HYDROCHLORIDE 20 MCG/MIN: 10 INJECTION INTRAVENOUS at 14:42

## 2022-10-12 RX ADMIN — ACETAMINOPHEN 650 MG: 325 TABLET, FILM COATED ORAL at 16:41

## 2022-10-12 RX ADMIN — PROPOFOL 100 MCG/KG/MIN: 10 INJECTION, EMULSION INTRAVENOUS at 14:30

## 2022-10-12 RX ADMIN — LEVOTHYROXINE SODIUM 100 MCG: 100 TABLET ORAL at 08:28

## 2022-10-12 RX ADMIN — HYDRALAZINE HYDROCHLORIDE 5 MG: 20 INJECTION, SOLUTION INTRAMUSCULAR; INTRAVENOUS at 11:12

## 2022-10-12 RX ADMIN — FENTANYL CITRATE 50 MCG: 50 INJECTION INTRAMUSCULAR; INTRAVENOUS at 14:30

## 2022-10-12 RX ADMIN — VENLAFAXINE HYDROCHLORIDE 75 MG: 75 CAPSULE, EXTENDED RELEASE ORAL at 08:28

## 2022-10-12 RX ADMIN — FENTANYL CITRATE 50 MCG: 50 INJECTION INTRAMUSCULAR; INTRAVENOUS at 14:54

## 2022-10-12 RX ADMIN — INSULIN LISPRO 1 UNITS: 100 INJECTION, SOLUTION INTRAVENOUS; SUBCUTANEOUS at 21:10

## 2022-10-12 RX ADMIN — HEPARIN SODIUM 5000 UNITS: 5000 INJECTION INTRAVENOUS; SUBCUTANEOUS at 05:54

## 2022-10-12 RX ADMIN — PROPOFOL 30 MG: 10 INJECTION, EMULSION INTRAVENOUS at 14:30

## 2022-10-12 RX ADMIN — PRAVASTATIN SODIUM 20 MG: 20 TABLET ORAL at 16:27

## 2022-10-12 RX ADMIN — PROPOFOL 120 MCG/KG/MIN: 10 INJECTION, EMULSION INTRAVENOUS at 15:11

## 2022-10-12 RX ADMIN — PROPOFOL 120 MCG/KG/MIN: 10 INJECTION, EMULSION INTRAVENOUS at 15:32

## 2022-10-12 RX ADMIN — HEPARIN SODIUM 5000 UNITS: 5000 INJECTION INTRAVENOUS; SUBCUTANEOUS at 21:09

## 2022-10-12 NOTE — ASSESSMENT & PLAN NOTE
Patient is a 54-year-old female who went to [facility] ER after syncopal episode she appears to have a complete heart block intermittently with a heart rate of 20-30 symptomatic  Patient denies any chest pain and sees a cardiologist in [place] who is associated with [facility]  Her last cardiology visit was a year ago due to near syncopal episodes she had a nuclear stress test and echocardiogram done he echocardiogram showed normal EF with mild MR, nuclear stress test was negative she does have a known left bundle-branch block     · Continue to monitor on tele overnight Keep external pacer pads on  · EP consult  · Keep NPO from midnight for pacemaker placement  · Patient takes telmisartan 20 mg daily for hypertension takes no beta-blockers or AV rosy blocking agents  · Replete and monitor electrolytes  · If patient becomes unstable during the night will inform intervention and Cardiology for TVP placement  · Isopropyl as needed to temporize

## 2022-10-12 NOTE — ASSESSMENT & PLAN NOTE
Per history patient had elevated liver enzymes before due Steatosis of the liver and per her Cardiology notes was a workup with GI    - Unable to find information    - Down trending  Plan:   - Trend AST/ALT outpatient

## 2022-10-12 NOTE — H&P
[address] [facility]  H&P- Thermrupa Gaffney 1955, 75 y o  female MRN: [id number]  Unit/Bed#: [facility] 513-01 Encounter: [id number]  Primary Care Provider: No primary care provider on file  Date and time admitted to hospital: 10/11/2022  5:47 PM    Elevated lactic acid level  Assessment & Plan  · Mildly elevated lactic level at 2 1  · Continue to trend  · Most likely related do to syncope    Transaminitis  Assessment & Plan  Per history patient had elevated liver enzymes before due Steatosis of the liver and per her Cardiology notes was a workup with GI  Unable to find information  Continue to trend AST/ALT     Hypertension  Assessment & Plan  Will hold blood pressure medication until tomorrow  Restart telmisartan 20 mg daily    Hyperlipidemia  Assessment & Plan  Continue statin     Disease of thyroid gland  Assessment & Plan  Continue levothyroxine  TSH was 3 38    Complete heart block [facility])  Assessment & Plan  Patient is a 56-year-old female who went to [facility] ER after syncopal episode she appears to have a complete heart block intermittently with a heart rate of 20-30 symptomatic  Patient denies any chest pain and sees a cardiologist in [place] who is associated with [facility]  Her last cardiology visit was a year ago due to near syncopal episodes she had a nuclear stress test and echocardiogram done he echocardiogram showed normal EF with mild MR, nuclear stress test was negative she does have a known left bundle-branch block     · Continue to monitor on tele overnight Keep external pacer pads on  · EP consult  · Keep NPO from midnight for pacemaker placement  · Patient takes telmisartan 20 mg daily for hypertension takes no beta-blockers or AV rosy blocking agents  · Replete and monitor electrolytes  · If patient becomes unstable during the night will inform intervention and Cardiology for TVP placement  · Isopropyl as needed to temporize    -------------------------------------------------------------------------------------------------------------  Chief Complaint:  Syncope    History of Present Illness   HX and PE limited by:   Abdiel Ellington is a 75 y o  female who presents as a transfer from [facility] with complete heart block  Per patient she was brought in by EMS for fall on the curb hitting her head and the ribs and chest she did have LOC  Patient remembers the fall and after she regained consciousness had lightheadedness and nausea, when evaluated by ER she was in complete heart block  On arrival to [place] she was in normal sinus rhythm at 70-80 but during transfer to [place] had a symptomatic bradycardia between 20 and 30  She stated she felt really nauseous ready to pass out while she was laying down  In reviewing history patient sees a cardiologist at [facility] in [place] she was evaluated for syncopal episodes last year, had a full neurology workup which was negative carotids were negative and an echocardiogram with a normal EF and mild MR which is known an EKG with left bundle-branch block and normal stress test      History obtained from chart review and the patient   -------------------------------------------------------------------------------------------------------------  Dispo: Admit to Critical Care     Code Status: Level 1 - Full Code  --------------------------------------------------------------------------------------------------------------  Review of Systems   Respiratory: Positive for shortness of breath  Cardiovascular: Positive for chest pain  Neurological: Positive for dizziness, syncope and light-headedness  A 12-point, complete review of systems was reviewed and negative except as stated above     Physical Exam  Vitals and nursing note reviewed  Constitutional:       General: She is not in acute distress  Appearance: She is well-developed  She is obese   She is not diaphoretic  HENT:      Head: Normocephalic and atraumatic  Mouth/Throat:      Pharynx: No oropharyngeal exudate  Eyes:      Conjunctiva/sclera: Conjunctivae normal       Pupils: Pupils are equal, round, and reactive to light  Neck:      Vascular: No JVD  Trachea: No tracheal deviation  Cardiovascular:      Rate and Rhythm: Normal rate and regular rhythm  Pulses: Normal pulses  Heart sounds: Normal heart sounds  No murmur heard  No friction rub  No gallop  Pulmonary:      Effort: Pulmonary effort is normal  No respiratory distress  Breath sounds: Normal breath sounds  No wheezing or rales  Chest:      Chest wall: No tenderness  Abdominal:      General: Bowel sounds are normal  There is no distension  Palpations: Abdomen is soft  Tenderness: There is no abdominal tenderness  There is no guarding  Musculoskeletal:         General: No tenderness or deformity  Normal range of motion  Cervical back: Normal range of motion and neck supple  Right lower leg: No edema  Left lower leg: No edema  Skin:     General: Skin is warm and dry  Capillary Refill: Capillary refill takes less than 2 seconds  Findings: No erythema  Neurological:      Mental Status: She is alert and oriented to person, place, and time  --------------------------------------------------------------------------------------------------------------  Vitals:   Vitals:    10/11/22 1900   BP: 151/60   Pulse: 84   Resp: (!) 25   SpO2: 91%     Temp  Min: 98 2 °F (36 8 °C)  Max: 98 2 °F (36 8 °C)        There is no height or weight on file to calculate BMI      Laboratory and Diagnostics:  Results from last 7 days   Lab Units 10/11/22  1841 10/11/22  1444   WBC Thousand/uL 8 69 6 50   HEMOGLOBIN g/dL 13 5 13 1   HEMATOCRIT % 40 7 40 1   PLATELETS Thousands/uL 200 199   NEUTROS PCT % 69 50   MONOS PCT % 6 9     Results from last 7 days   Lab Units 10/11/22  1844 10/11/22  1444 SODIUM mmol/L 138 138   POTASSIUM mmol/L 3 6 4 0   CHLORIDE mmol/L 111* 102   CO2 mmol/L 22 27   ANION GAP mmol/L 5 9   BUN mg/dL 22 23   CREATININE mg/dL 0 86 0 95   CALCIUM mg/dL 9 1 10 3*   GLUCOSE RANDOM mg/dL 150* 184*   ALT U/L 106* 132*   AST U/L 68* 86*   ALK PHOS U/L 75 99   ALBUMIN g/dL 3 5 4 2   TOTAL BILIRUBIN mg/dL 0 89 1 14*     Results from last 7 days   Lab Units 10/11/22  1844   MAGNESIUM mg/dL 1 9   PHOSPHORUS mg/dL 3 1               Results from last 7 days   Lab Units 10/11/22  1841   LACTIC ACID mmol/L 2 2*     ABG:    VBG:          Micro:        EKG: NSR with LBBB  Imaging: I have personally reviewed pertinent reports  Historical Information   Past Medical History:   Diagnosis Date   • Diabetes mellitus ([facility] [address] )    • Disease of thyroid gland    • Hyperlipidemia    • Hypertension      Past Surgical History:   Procedure Laterality Date   • TONSILLECTOMY       Social History   Social History     Substance and Sexual Activity   Alcohol Use None    Comment: occ     Social History     Substance and Sexual Activity   Drug Use Not on file     Social History     Tobacco Use   Smoking Status Never Smoker   Smokeless Tobacco Never Used     Exercise History:   Family History:   No family history on file    I have reviewed this patient's family history and commented on sigificant items within the HPI      Medications:  Current Facility-Administered Medications   Medication Dose Route Frequency   • heparin (porcine) subcutaneous injection 5,000 Units  5,000 Units Subcutaneous Q8H [facility]   • [START ON 10/12/2022] levothyroxine tablet 100 mcg  100 mcg Oral Daily   • potassium chloride 20 mEq IVPB (premix)  20 mEq Intravenous Once   • [START ON 10/12/2022] pravastatin (PRAVACHOL) tablet 20 mg  20 mg Oral Daily With Dinner   • [START ON 10/12/2022] venlafaxine (EFFEXOR-XR) 24 hr capsule 75 mg  75 mg Oral Daily     Home medications:  Prior to Admission Medications   Prescriptions Last Dose Informant Patient Reported? Taking? Cyanocobalamin 1000 MCG/ML KIT   Yes No   Sig: Take by mouth   levothyroxine 100 mcg tablet   Yes No   Sig: Take 100 mcg by mouth daily   pravastatin (PRAVACHOL) 20 mg tablet   Yes No   Sig: Take 20 mg by mouth daily   telmisartan (MICARDIS) 20 MG tablet   Yes No   Sig: Take 20 mg by mouth daily   venlafaxine (EFFEXOR-XR) 75 mg 24 hr capsule   Yes No   Sig: Take 1 capsule by mouth daily      Facility-Administered Medications: None     Allergies:  No Known Allergies    ------------------------------------------------------------------------------------------------------------  Advance Directive and Living Will:      Power of :    POLST:    ------------------------------------------------------------------------------------------------------------  Anticipated Length of Stay is > 2 midnights    Care Time Delivered:   Upon my evaluation, this patient had a high probability of imminent or life-threatening deterioration due to Intermittent third-degree heart block possible sick sinus syndrome, which required my direct attention, intervention, and personal management  I have personally provided 45 minutes (1930 to 2015) of critical care time, exclusive of procedures, teaching, family meetings, and any prior time recorded by providers other than myself  Zeb Boyle PA-C        Portions of the record may have been created with voice recognition software  Occasional wrong word or "sound a like" substitutions may have occurred due to the inherent limitations of voice recognition software    Read the chart carefully and recognize, using context, where substitutions have occurred

## 2022-10-12 NOTE — ANESTHESIA PREPROCEDURE EVALUATION
Procedure:  Cardiac pacer implant (N/A Chest)    Relevant Problems   CARDIO   (+) Complete heart block (HCC)   (+) Hyperlipidemia   (+) Hypertension      PULMONARY   (+) Sleep apnea      Echo 10/12/2022:  •  Left Ventricle: Left ventricular cavity size is normal  Wall thickness is moderately increased  The left ventricular ejection fraction is 55%  Systolic function is normal  Wall motion is normal  Diastolic function is mildly abnormal, consistent with grade I (abnormal) relaxation  •  Mitral Valve: There is mild regurgitation  •  Tricuspid Valve: There is mild regurgitation  •  Pulmonic Valve: There is mild regurgitation        Physical Exam    Airway    Mallampati score: II  TM Distance: >3 FB  Neck ROM: full     Dental   No notable dental hx     Cardiovascular  Cardiovascular exam normal    Pulmonary  Pulmonary exam normal     Other Findings        Anesthesia Plan  ASA Score- 2     Anesthesia Type- IV sedation with anesthesia with ASA Monitors  Additional Monitors:   Airway Plan:           Plan Factors-Exercise tolerance (METS): >4 METS  Obstructive sleep apnea risk education given perioperatively  Induction- intravenous  Postoperative Plan- Plan for postoperative opioid use  Informed Consent- Anesthetic plan and risks discussed with patient  I personally reviewed this patient with the CRNA  Discussed and agreed on the Anesthesia Plan with the CRNA  Fede Edwards

## 2022-10-12 NOTE — PROGRESS NOTES
INTERNAL MEDICINE RESIDENCY TRANSFER ACCEPTANCE NOTE     Name: Bola Gomez   Age & Sex: 75 y o  female   MRN: [id number]  Unit/Bed#: [facility] 513-01   Encounter: [id number]  Hospital Stay Days: 1    Accepting team: SOD Team A  Code Status: Level 1 - Full Code  Disposition: Patient requires Med/Surg with Telemetry    ASSESSMENT/PLAN     Principal Problem:    Complete heart block ([address] )  Active Problems:    Disease of thyroid gland    Hyperlipidemia    Hypertension    Transaminitis    Elevated lactic acid level    Sleep apnea      * Complete heart block [facility])  Assessment & Plan  Patient is a 27-year-old female who went to [facility] ER after syncopal episode she appears to have a complete heart block intermittently with a heart rate of 20-30 symptomatic  Patient denies any chest pain and sees a cardiologist in [place] who is associated with [facility]  Her last cardiology visit was a year ago due to near syncopal episodes she had a nuclear stress test and echocardiogram done he echocardiogram showed normal EF with mild MR, nuclear stress test was negative she does have a known left bundle-branch block  · EP consulted, their recommendations are appreciated  · Patient takes telmisartan 20 mg daily for hypertension takes no beta-blockers or AV rosy blocking agents  · Replete and monitor electrolytes  · Patient is status post pacemaker placement on 10/12/2022    Elevated lactic acid level  Assessment & Plan  · Mildly elevated lactic level at 2 1  · Continue to trend  · Most likely related do to syncope    Transaminitis  Assessment & Plan  Per history patient had elevated liver enzymes before due Steatosis of the liver and per her Cardiology notes was a workup with GI     Unable to find information  Continue to trend AST/ALT     Hypertension  Assessment & Plan  Can restart telmisartan 20 mg daily    Hyperlipidemia  Assessment & Plan  Continue statin     Disease of thyroid gland  Assessment & Plan  Continue levothyroxine  TSH was 3 38      VTE Pharmacologic Prophylaxis: Heparin  VTE Mechanical Prophylaxis: sequential compression device    HOSPITAL COURSE     Ms Bola Gomez is a 74-year-old female with past medical history of hypothyroidism, hyperlipidemia  Patient presented to [address] [place] [address] as a transfer from [facility] ER after patient was found to be in complete heart block after sustaining a syncopal episode  Per patient, she was brought in by EMS for fall in a curb where she hit her head, ribs, and chest   Patient states that she did also have loss of consciousness at that time  Patient states that she remembers the fall and afterwards when she regained consciousness she felt lightheaded and nauseous  At that point she was brought in by EMS was found to be in complete heart block  Patient was subsequently transferred to [facility] for electrophysiology evaluation  On arrival to [place], patient was in normal sinus rhythm at 70-80 but during transferred to [place] she had asymptomatic bradycardia between 20 and 30  At that time patient states that she felt very nauseous and was ready to pass out she was lying down  In reviewing chart, patient does see a cardiologist at [facility] in [place] and patient was evaluated for syncopal episodes in the past   She had a full neurology workup which was negative  Carotids were negative and an echocardiogram was within normal ejection fraction mild mitral regurgitation  Patient has a known left bundle branch block on EKG and had a normal stress test in the past   Patient was admitted to the critical care service at time of presentation to [facility]  Patient was seen and evaluated by electrophysiology service at [place]  On 10/12 patient is status post pacemaker placement  Patient's ICU stay was uneventful and she remained in sinus rhythm  Patient's labs remained stable and she remained hemodynamically stable  Following pacemaker placement, patient is stable for transfer to med/surge floor with telemetry monitoring under the SOD-A service  SUBJECTIVE     Upon my encounter patient had just arrived back onto hospital floor after undergoing pacemaker placement  Patient was resting comfortably in hospital bed  Presently denies any fever, chills, nausea, vomiting, diarrhea, constipation, chest pain, shortness a breath  Her only complaint was of some mild pain from procedure  Otherwise denies any new or worsening complaints  OBJECTIVE     Vitals:    10/12/22 1000 10/12/22 1001 10/12/22 1100 10/12/22 1200   BP: (!) 172/78 (!) 172/78 (!) 184/79 149/67   BP Location:    Left arm   Pulse: 65 66 69 73   Resp:  (!) 23 (!) 29 (!) 27   Temp:    98 4 °F (36 9 °C)   TempSrc:    Oral   SpO2: 94% 94% 94% 94%   Weight:  91 6 kg (202 lb)     Height:  5' 2" (1 575 m)       I/O last 24 hours: In: 700 [I V :600; IV Piggyback:100]  Out: 900 [Urine:900]    Physical Exam  Constitutional:       General: She is not in acute distress  Appearance: Normal appearance  She is obese  She is not ill-appearing  Cardiovascular:      Rate and Rhythm: Normal rate and regular rhythm  Pulses: Normal pulses  Heart sounds: Normal heart sounds  No murmur heard  Pulmonary:      Effort: Pulmonary effort is normal  No respiratory distress  Breath sounds: Normal breath sounds  No wheezing, rhonchi or rales  Abdominal:      General: Abdomen is flat  Bowel sounds are normal  There is no distension  Palpations: Abdomen is soft  Tenderness: There is no abdominal tenderness  Musculoskeletal:      Right lower leg: No edema  Left lower leg: No edema  Neurological:      Mental Status: She is alert and oriented to person, place, and time  Psychiatric:         Mood and Affect: Mood normal          Behavior: Behavior normal          Thought Content: Thought content normal         LABORATORY DATA     Labs:  I have personally reviewed pertinent reports  Results from last 7 days   Lab Units 10/12/22  0446 10/11/22  1841 10/11/22  1444   WBC Thousand/uL 6 74 8 69 6 50   HEMOGLOBIN g/dL 12 9 13 5 13 1   HEMATOCRIT % 39 0 40 7 40 1   PLATELETS Thousands/uL 185 200 199   NEUTROS PCT % 55 69 50   MONOS PCT % 7 6 9      Results from last 7 days   Lab Units 10/12/22  0446 10/11/22  1844 10/11/22  1444   POTASSIUM mmol/L 3 9 3 6 4 0   CHLORIDE mmol/L 107 111* 102   CO2 mmol/L 25 22 27   BUN mg/dL 24 22 23   CREATININE mg/dL 0 76 0 86 0 95   CALCIUM mg/dL 9 7 9 1 10 3*   ALK PHOS U/L  --  75 99   ALT U/L  --  106* 132*   AST U/L  --  68* 86*     Results from last 7 days   Lab Units 10/12/22  0446 10/11/22  1844   MAGNESIUM mg/dL 2 1 1 9     Results from last 7 days   Lab Units 10/12/22  0446 10/11/22  1844   PHOSPHORUS mg/dL 3 6 3 1          Results from last 7 days   Lab Units 10/11/22  2301   LACTIC ACID mmol/L 1 9         Micro:  No results found for: Rao Berry, WOUNDCULT, 2025 [facility] TESTING     Imaging: I have personally reviewed pertinent reports  XR ribs with pa chest min 3 views LEFT    Result Date: 10/12/2022  Impression: No evidence of a rib fracture  Workstation performed: [id number]     XR knee 4+ vw left injury    Result Date: 10/12/2022  Impression: No acute osseous abnormality  Workstation performed: [id number]     CT head without contrast    Result Date: 10/11/2022  Impression: No acute intracranial abnormality  Workstation performed: [id number]     EKG, Pathology, and Other Studies: I have personally reviewed pertinent reports       ALLERGIES   No Known Allergies  MEDICATIONS     Current Facility-Administered Medications   Medication Dose Route Frequency Provider Last Rate   • heparin (porcine)  5,000 Units Subcutaneous [place], [place]     • hydrALAZINE  5 mg Intravenous Q6H PRN MONET Pisano     • insulin lispro  1-6 Units Subcutaneous [facility] MONET Fritz     • levothyroxine  100 mcg Oral Daily Ronel Segurae, [address]     • pravastatin  20 mg Oral Daily With [employer], [address]     • venlafaxine  75 mg Oral Daily MONET Encarnacion          hydrALAZINE, 5 mg, Q6H PRN        Portions of the record may have been created with voice recognition software  Occasional wrong word or "sound a like" substitutions may have occurred due to the inherent limitations of voice recognition software    Read the chart carefully and recognize, using context, where substitutions have occurred     ==  [facility]  Internal Medicine Residency PGY-2

## 2022-10-12 NOTE — ASSESSMENT & PLAN NOTE
Presented to [facility] ER after witnessed syncopal episode and mechanical fall with LOC (less than a minute)  History of presyncopal episodes, dizziness and LBBB seen by cardiology and Neurology with negative workup  Not on any AV rosy blocking agents, no history of tick bites, no chest pain/agina  Per chart, symptomatic CHB during transfer to [facility] with HR 20-30 but on arrival NSR with HR 70-80    - EKG on 10/11/2022 showing complete heart block  - Echo on 10/20/2022 showing LVEF 55% with grade 1 diastolic dysfunction  - Nuclear stress test on 10/01/2020 reversible anterior defect with subsequent follow-up CTA showing mild CAD   - S/p PPM on 10/12/22  - on telemetry normal sinus rhythm  Plan:  · EP recommendations appreciated  · Avoid AV rosy blocking agents

## 2022-10-12 NOTE — ASSESSMENT & PLAN NOTE
Patient is a 76-year-old female who went to [facility] ER after syncopal episode she appears to have a complete heart block intermittently with a heart rate of 20-30 symptomatic  Patient denies any chest pain and sees a cardiologist in [place] who is associated with [facility]  Her last cardiology visit was a year ago due to near syncopal episodes she had a nuclear stress test and echocardiogram done he echocardiogram showed normal EF with mild MR, nuclear stress test was negative she does have a known left bundle-branch block     · Continue to monitor on tele overnight Keep external pacer pads on  · EP consult  · Keep NPO from midnight for pacemaker placement  · Patient takes telmisartan 20 mg daily for hypertension takes no beta-blockers or AV rosy blocking agents  · Replete and monitor electrolytes  · If patient becomes unstable during the night will inform intervention and Cardiology for TVP placement  · Isopropyl as needed to temporize

## 2022-10-12 NOTE — PROGRESS NOTES
[address] [facility]  Progress Note - Catina Sparks 1955, 75 y o  female MRN: [id number]  Unit/Bed#: [facility] 513-01 Encounter: [id number]  Primary Care Provider: No primary care provider on file  Date and time admitted to hospital: 10/11/2022  5:47 PM    Elevated lactic acid level  Assessment & Plan  · Mildly elevated lactic level at 2 1  · Continue to trend  · Most likely related do to syncope    Transaminitis  Assessment & Plan  Per history patient had elevated liver enzymes before due Steatosis of the liver and per her Cardiology notes was a workup with GI  Unable to find information  Continue to trend AST/ALT     Hypertension  Assessment & Plan  Will hold blood pressure medication until tomorrow  Restart telmisartan 20 mg daily    Hyperlipidemia  Assessment & Plan  Continue statin     Disease of thyroid gland  Assessment & Plan  Continue levothyroxine  TSH was 3 38    Complete heart block [facility])  Assessment & Plan  Patient is a 24-year-old female who went to [facility] ER after syncopal episode she appears to have a complete heart block intermittently with a heart rate of 20-30 symptomatic  Patient denies any chest pain and sees a cardiologist in [place] who is associated with [facility]  Her last cardiology visit was a year ago due to near syncopal episodes she had a nuclear stress test and echocardiogram done he echocardiogram showed normal EF with mild MR, nuclear stress test was negative she does have a known left bundle-branch block     · Continue to monitor on tele overnight Keep external pacer pads on  · EP consult  · Keep NPO from midnight for pacemaker placement  · Patient takes telmisartan 20 mg daily for hypertension takes no beta-blockers or AV rosy blocking agents  · Replete and monitor electrolytes  · If patient becomes unstable during the night will inform intervention and Cardiology for TVP placement  · Isopropyl as needed to temporize      ----------------------------------------------------------------------------------------  HPI/24hr events:   · Patient was a transfer from [facility] for third-degree heart block arrived here in normal sinus rhythm with left bundle-branch block patient had syncopal episode  · Was seen by cardiology to be for pacemaker tomorrow NPO after midnight    Patient appropriate for transfer out of the ICU today?: No  Disposition: Continue Critical Care   Code Status: Level 1 - Full Code  ---------------------------------------------------------------------------------------  SUBJECTIVE  Patient has no complaints    Review of Systems   Respiratory: Positive for cough and shortness of breath  Cardiovascular: Positive for chest pain  Neurological: Positive for dizziness, syncope and light-headedness  Review of systems was reviewed and negative unless stated above in HPI/24-hour events   ---------------------------------------------------------------------------------------  OBJECTIVE    Vitals   Vitals:    10/11/22 2318 10/12/22 0000 10/12/22 0200 10/12/22 0500   BP: 170/74 (!) 173/75 155/70 150/67   Pulse: 67 66 66 61   Resp: (!) 23 (!) 26 (!) 23 22   SpO2: 93% 93% 93% 92%     Temp (24hrs), Av 2 °F (36 8 °C), Min:98 2 °F (36 8 °C), Max:98 2 °F (36 8 °C)  Current:            Respiratory:  SpO2: SpO2: 92 %, SpO2 Activity:  , SpO2 Device:         Invasive/non-invasive ventilation settings   Respiratory  Report   Lab Data (Last 4 hours)    None         O2/Vent Data (Last 4 hours)    None                Physical Exam  Vitals and nursing note reviewed  Constitutional:       General: She is not in acute distress  Appearance: She is well-developed  She is obese  She is not ill-appearing or diaphoretic  HENT:      Head: Normocephalic and atraumatic  Mouth/Throat:      Pharynx: No oropharyngeal exudate     Eyes:      Conjunctiva/sclera: Conjunctivae normal       Pupils: Pupils are equal, round, and reactive to light  Neck:      Vascular: No JVD  Trachea: No tracheal deviation  Cardiovascular:      Rate and Rhythm: Normal rate and regular rhythm  Pulses: Normal pulses  Heart sounds: Normal heart sounds  No murmur heard  No friction rub  No gallop  Pulmonary:      Effort: Pulmonary effort is normal  No respiratory distress  Breath sounds: Normal breath sounds  No wheezing or rales  Chest:      Chest wall: No tenderness  Abdominal:      General: Bowel sounds are normal  There is no distension  Palpations: Abdomen is soft  Tenderness: There is no abdominal tenderness  There is no guarding  Musculoskeletal:         General: No tenderness or deformity  Normal range of motion  Cervical back: Normal range of motion and neck supple  Right lower leg: Edema present  Left lower leg: Edema present  Skin:     General: Skin is warm and dry  Capillary Refill: Capillary refill takes less than 2 seconds  Findings: No erythema  Neurological:      Mental Status: She is alert and oriented to person, place, and time               Laboratory and Diagnostics:  Results from last 7 days   Lab Units 10/12/22  0446 10/11/22  1841 10/11/22  1444   WBC Thousand/uL 6 74 8 69 6 50   HEMOGLOBIN g/dL 12 9 13 5 13 1   HEMATOCRIT % 39 0 40 7 40 1   PLATELETS Thousands/uL 185 200 199   NEUTROS PCT % 55 69 50   MONOS PCT % 7 6 9     Results from last 7 days   Lab Units 10/12/22  0446 10/11/22  1844 10/11/22  1444   SODIUM mmol/L 138 138 138   POTASSIUM mmol/L 3 9 3 6 4 0   CHLORIDE mmol/L 107 111* 102   CO2 mmol/L 25 22 27   ANION GAP mmol/L 6 5 9   BUN mg/dL 24 22 23   CREATININE mg/dL 0 76 0 86 0 95   CALCIUM mg/dL 9 7 9 1 10 3*   GLUCOSE RANDOM mg/dL 165* 150* 184*   ALT U/L  --  106* 132*   AST U/L  --  68* 86*   ALK PHOS U/L  --  75 99   ALBUMIN g/dL  --  3 5 4 2   TOTAL BILIRUBIN mg/dL  --  0 89 1 14*     Results from last 7 days   Lab Units 10/12/22  0446 10/11/22  1844   MAGNESIUM mg/dL 2 1 1 9   PHOSPHORUS mg/dL 3 6 3 1               Results from last 7 days   Lab Units 10/11/22  2301 10/11/22  1841   LACTIC ACID mmol/L 1 9 2 2*     ABG:    VBG:          Micro        EKG: nsr WITH lbbb    Imaging: I have personally reviewed pertinent reports  Intake and Output  I/O       10/10 0701  10/11 0700 10/11 0701  10/12 0700    IV Piggyback  100    Total Intake  100    Urine  400    Total Output  400    Net  -300                Height and Weights         There is no height or weight on file to calculate BMI  Weight (last 2 days)     None            Nutrition       Diet Orders   (From admission, onward)             Start     Ordered    10/11/22 1804  Diet NPO  Diet effective now        References:    Nutrtion Support Algorithm Enteral vs  Parenteral   Question Answer Comment   Diet Type NPO    RD to adjust diet per protocol?  No        10/11/22 1813                  Active Medications  Scheduled Meds:  Current Facility-Administered Medications   Medication Dose Route Frequency Provider Last Rate   • heparin (porcine)  5,000 Units Subcutaneous Q8H [facility] Zeb Boyle PA-C     • levothyroxine  100 mcg Oral Daily Zeb Boyle PA-C     • pravastatin  20 mg Oral Daily With [employer] ANUP Boyle PA-C     • venlafaxine  75 mg Oral Daily Zeb Boyle PA-C       Continuous Infusions:     PRN Meds:        Invasive Devices Review  Invasive Devices  Report    Peripheral Intravenous Line  Duration           Peripheral IV 10/11/22 Left Hand 1 day    Peripheral IV 10/11/22 Right Antecubital <1 day                Rationale for remaining devices:   ---------------------------------------------------------------------------------------  Advance Directive and Living Will:      Power of :    POLST:    ---------------------------------------------------------------------------------------  Care Time Delivered:   Upon my evaluation, this patient had a high probability of imminent or life-threatening deterioration due to THIRD-DEGREE [address], which required my direct attention, intervention, and personal management  I have personally provided 45 minutes (0300 to 0345) of critical care time, exclusive of procedures, teaching, family meetings, and any prior time recorded by providers other than myself  Zeb Boyle PA-C      Portions of the record may have been created with voice recognition software  Occasional wrong word or "sound a like" substitutions may have occurred due to the inherent limitations of voice recognition software    Read the chart carefully and recognize, using context, where substitutions have occurred

## 2022-10-12 NOTE — ASSESSMENT & PLAN NOTE
At home on Telmisartan 20 mg daily    - Not controlled  SBP in range of 140-170s  - Restarted losartan 25 mg daily   - Hydralazine 5 mg p r n  Q 6 hours for SBP > 180  - Monitor vitals

## 2022-10-12 NOTE — ANESTHESIA POSTPROCEDURE EVALUATION
Post-Op Assessment Note    CV Status:  Stable  Pain Score: 0    Pain management: adequate     Mental Status:  Alert and awake   Hydration Status:  Euvolemic   PONV Controlled:  Controlled   Airway Patency:  Patent      Post Op Vitals Reviewed: Yes      Staff: CRNA         No complications documented      BP   136/68   Temp      Pulse  90   Resp  16   SpO2   94%

## 2022-10-12 NOTE — ASSESSMENT & PLAN NOTE
Per history patient had elevated liver enzymes before due Steatosis of the liver and per her Cardiology notes was a workup with GI     Unable to find information  Continue to trend AST/ALT

## 2022-10-13 ENCOUNTER — APPOINTMENT (OUTPATIENT)
Dept: RADIOLOGY | Facility: HOSPITAL | Age: 67
DRG: 244 | End: 2022-10-13
Payer: MEDICARE

## 2022-10-13 VITALS
DIASTOLIC BLOOD PRESSURE: 81 MMHG | HEART RATE: 80 BPM | RESPIRATION RATE: 16 BRPM | HEIGHT: 62 IN | SYSTOLIC BLOOD PRESSURE: 123 MMHG | OXYGEN SATURATION: 93 % | BODY MASS INDEX: 34.45 KG/M2 | TEMPERATURE: 98 F | WEIGHT: 187.2 LBS

## 2022-10-13 PROBLEM — R79.89 ELEVATED LACTIC ACID LEVEL: Status: RESOLVED | Noted: 2022-10-11 | Resolved: 2022-10-13

## 2022-10-13 LAB
ANION GAP SERPL CALCULATED.3IONS-SCNC: 6 MMOL/L (ref 4–13)
BUN SERPL-MCNC: 19 MG/DL (ref 5–25)
CALCIUM SERPL-MCNC: 9.5 MG/DL (ref 8.3–10.1)
CHLORIDE SERPL-SCNC: 106 MMOL/L (ref 96–108)
CO2 SERPL-SCNC: 24 MMOL/L (ref 21–32)
CREAT SERPL-MCNC: 0.65 MG/DL (ref 0.6–1.3)
ERYTHROCYTE [DISTWIDTH] IN BLOOD BY AUTOMATED COUNT: 13.5 % (ref 11.6–15.1)
GFR SERPL CREATININE-BSD FRML MDRD: 91 ML/MIN/1.73SQ M
GLUCOSE SERPL-MCNC: 103 MG/DL (ref 65–140)
GLUCOSE SERPL-MCNC: 137 MG/DL (ref 65–140)
GLUCOSE SERPL-MCNC: 153 MG/DL (ref 65–140)
HBV CORE AB SER QL: NORMAL
HBV CORE IGM SER QL: NORMAL
HBV SURFACE AG SER QL: NORMAL
HCT VFR BLD AUTO: 38.9 % (ref 34.8–46.1)
HCV AB SER QL: NORMAL
HGB BLD-MCNC: 12.4 G/DL (ref 11.5–15.4)
MAGNESIUM SERPL-MCNC: 1.9 MG/DL (ref 1.6–2.6)
MCH RBC QN AUTO: 29.5 PG (ref 26.8–34.3)
MCHC RBC AUTO-ENTMCNC: 31.9 G/DL (ref 31.4–37.4)
MCV RBC AUTO: 92 FL (ref 82–98)
PLATELET # BLD AUTO: 185 THOUSANDS/UL (ref 149–390)
PMV BLD AUTO: 11.4 FL (ref 8.9–12.7)
POTASSIUM SERPL-SCNC: 3.7 MMOL/L (ref 3.5–5.3)
RBC # BLD AUTO: 4.21 MILLION/UL (ref 3.81–5.12)
SODIUM SERPL-SCNC: 136 MMOL/L (ref 135–147)
WBC # BLD AUTO: 5.65 THOUSAND/UL (ref 4.31–10.16)

## 2022-10-13 PROCEDURE — 83735 ASSAY OF MAGNESIUM: CPT | Performed by: NURSE PRACTITIONER

## 2022-10-13 PROCEDURE — 99232 SBSQ HOSP IP/OBS MODERATE 35: CPT | Performed by: PHYSICIAN ASSISTANT

## 2022-10-13 PROCEDURE — 99238 HOSP IP/OBS DSCHRG MGMT 30/<: CPT | Performed by: INTERNAL MEDICINE

## 2022-10-13 PROCEDURE — 80048 BASIC METABOLIC PNL TOTAL CA: CPT | Performed by: NURSE PRACTITIONER

## 2022-10-13 PROCEDURE — 86705 HEP B CORE ANTIBODY IGM: CPT

## 2022-10-13 PROCEDURE — 86803 HEPATITIS C AB TEST: CPT

## 2022-10-13 PROCEDURE — 82948 REAGENT STRIP/BLOOD GLUCOSE: CPT

## 2022-10-13 PROCEDURE — 86704 HEP B CORE ANTIBODY TOTAL: CPT

## 2022-10-13 PROCEDURE — 87340 HEPATITIS B SURFACE AG IA: CPT

## 2022-10-13 PROCEDURE — 71046 X-RAY EXAM CHEST 2 VIEWS: CPT

## 2022-10-13 PROCEDURE — 85027 COMPLETE CBC AUTOMATED: CPT | Performed by: NURSE PRACTITIONER

## 2022-10-13 RX ORDER — LOSARTAN POTASSIUM 25 MG/1
25 TABLET ORAL DAILY
Status: DISCONTINUED | OUTPATIENT
Start: 2022-10-13 | End: 2022-10-13 | Stop reason: HOSPADM

## 2022-10-13 RX ORDER — ACETAMINOPHEN 325 MG/1
325 TABLET ORAL EVERY 6 HOURS PRN
Status: DISCONTINUED | OUTPATIENT
Start: 2022-10-13 | End: 2022-10-13 | Stop reason: HOSPADM

## 2022-10-13 RX ADMIN — ACETAMINOPHEN 650 MG: 325 TABLET, FILM COATED ORAL at 00:00

## 2022-10-13 RX ADMIN — HEPARIN SODIUM 5000 UNITS: 5000 INJECTION INTRAVENOUS; SUBCUTANEOUS at 06:35

## 2022-10-13 RX ADMIN — LOSARTAN POTASSIUM 25 MG: 25 TABLET, FILM COATED ORAL at 09:07

## 2022-10-13 RX ADMIN — LEVOTHYROXINE SODIUM 100 MCG: 100 TABLET ORAL at 09:08

## 2022-10-13 RX ADMIN — VENLAFAXINE HYDROCHLORIDE 75 MG: 75 CAPSULE, EXTENDED RELEASE ORAL at 10:34

## 2022-10-13 NOTE — CASE MANAGEMENT
Case Management Progress Note    Patient name iKp Gaffney  Location [address] [id number] MRN [id number]  : 1955 Date 10/13/2022       LOS (days): 2  Geometric Mean LOS (GMLOS) (days): 2 10  Days to GMLOS:0 3        OBJECTIVE:        Current admission status: Inpatient  Preferred Pharmacy:   [facility]/pharmacy #[id number] [address]  Phone: [phone] Fax: [phone]    Primary Care Provider: No primary care provider on file  Primary Insurance: MEDICARE  Secondary Insurance: [facility]    PROGRESS NOTE:  EP cleared  Per SOD A resident Dr Neo Castano, patient will discharge to home today and has no discharge/CM needs

## 2022-10-13 NOTE — PROGRESS NOTES
Progress Note - Electrophysiology-Cardiology (EP)   [place] 75 y o  female MRN: [id number]  Unit/Bed#: -01 Encounter: [id number]      Assessment:  1  Intermittent complete heart block, status post Medtronic dual-chamber pacemaker implantation 10/11/2022   2  Normal LV systolic function with EF 55% per echo this admission  3  Baseline left bundle-branch block  4  Hypertension  5  Hyperlipidemia  6  Diabetes mellitus type 2  7  Obesity with BMI 34      Plan:  Device interrogation today shows appropriate device function, including lead sensing, thresholds, and impedances  Incision is clean, dry, intact without swelling, hematoma, redness, bleeding, drainage, or signs of infection  Chest xray this morning shows appropriate lead placement without pneumothorax  All post implantation discharge instructions and restrictions were reviewed in detail, all questions were answered  She lives in [place], and is already established with a cardiologist (Dr Jasper Mckeon)  She is going to contact him upon discharge to arrange a 2 week follow up appointment with his device clinic, but she knows to contact us if there are any issues or if she needs to be seen by our practice  OK to discharge from EP standpoint, please contact us with questions or concerns  Subjective/Objective   Chief Complaint: no acute complaints    Subjective: She reports mild soreness at the device site, generally well tolerated  She otherwise denies cardiac complaints and feels well overall  She is eager for potential discharge later today         Objective:     Vitals: BP (!) 181/85   Pulse 81   Temp 97 7 °F (36 5 °C)   Resp 17   Ht 5' 2" (1 575 m)   Wt 84 9 kg (187 lb 3 2 oz)   SpO2 95%   BMI 34 24 kg/m²   Vitals:    10/12/22 1001 10/13/22 0638   Weight: 91 6 kg (202 lb) 84 9 kg (187 lb 3 2 oz)     Orthostatic Blood Pressures    Flowsheet Row Most Recent Value   Blood Pressure 181/85 filed at 10/13/2022 0908   Patient Position - Orthostatic VS Lying filed at 10/12/2022 1600            Intake/Output Summary (Last 24 hours) at 10/13/2022 1131  Last data filed at 10/12/2022 1916  Gross per 24 hour   Intake 600 ml   Output 550 ml   Net 50 ml       Invasive Devices  Report    Peripheral Intravenous Line  Duration           Peripheral IV 10/11/22 Left Hand 2 days    Peripheral IV 10/11/22 Right Antecubital 1 day                            Scheduled Meds:  Current Facility-Administered Medications   Medication Dose Route Frequency Provider Last Rate   • acetaminophen  325 mg Oral Q6H PRN Kylah Calderon DO     • heparin (porcine)  5,000 Units Subcutaneous [facility], [address]     • hydrALAZINE  5 mg Intravenous Q6H PRN MONET Christensen     • insulin lispro  1-6 Units Subcutaneous Q6H [address] [place] [address] Dr Araujo, [address]     • levothyroxine  100 mcg Oral Daily Mariya Araujo, [address]     • losartan  25 mg Oral Daily Vikas Castano MD     • pravastatin  20 mg Oral Daily With [employer], [address]     • venlafaxine  75 mg Oral Daily MONET Al       Continuous Infusions:   PRN Meds: •  acetaminophen  •  hydrALAZINE    Review of Systems   Constitutional: Negative for fever and malaise/fatigue  Cardiovascular: Positive for chest pain (mild soreness at device site)  Negative for dyspnea on exertion, irregular heartbeat, leg swelling, near-syncope, orthopnea, palpitations, paroxysmal nocturnal dyspnea and syncope  All other systems reviewed and are negative          Physical Exam:   GEN: NAD, alert and oriented x 3, well appearing  SKIN: dry without significant lesions or rashes  HEENT: NCAT, PERRL, EOMs intact  NECK: No JVD appreciated  CARDIOVASCULAR: RRR, normal S1, S2 without murmurs, rubs, or gallops appreciated  LUNGS: Clear to auscultation bilaterally without wheezes, rhonchi, or rales  ABDOMEN: Soft, nontender, nondistended  EXTREMITIES/VASCULAR: perfused without clubbing, cyanosis, or LE edema b/l  PSYCH: Normal mood and affect  NEURO: CN ll-Xll grossly intact                Lab Results: I have personally reviewed pertinent lab results  Results from last 7 days   Lab Units 10/13/22  0455 10/12/22  0446 10/11/22  1841   WBC Thousand/uL 5 65 6 74 8 69   HEMOGLOBIN g/dL 12 4 12 9 13 5   HEMATOCRIT % 38 9 39 0 40 7   PLATELETS Thousands/uL 185 185 200     Results from last 7 days   Lab Units 10/13/22  0455 10/12/22  0446 10/11/22  1844   POTASSIUM mmol/L 3 7 3 9 3 6   CHLORIDE mmol/L 106 107 111*   CO2 mmol/L 24 25 22   BUN mg/dL 19 24 22   CREATININE mg/dL 0 65 0 76 0 86   CALCIUM mg/dL 9 5 9 7 9 1         Results from last 7 days   Lab Units 10/13/22  0455 10/12/22  0446 10/11/22  1844   MAGNESIUM mg/dL 1 9 2 1 1 9         Imaging: I have personally reviewed pertinent reports  ECHO: Interpretation Summary       •  Left Ventricle: Left ventricular cavity size is normal  Wall thickness is moderately increased  The left ventricular ejection fraction is 55%  Systolic function is normal  Wall motion is normal  Diastolic function is mildly abnormal, consistent with grade I (abnormal) relaxation  •  Mitral Valve: There is mild regurgitation  •  Tricuspid Valve: There is mild regurgitation  •  Pulmonic Valve: There is mild regurgitation        EKG/TELEMETRY: normal sinus rhythm, baseline LBBB, no arrhythmias      VTE Pharmacologic Prophylaxis: Heparin subQ

## 2022-10-13 NOTE — NURSING NOTE
Discharge summary reviewed with pt, both IVs removed as well as telemetry monitor  Pt ambulatory and discharged home accompanied by son in law

## 2022-10-13 NOTE — DISCHARGE INSTR - AVS FIRST PAGE
- Follow-up with PCP within 1-2 weeks  - Follow-up with cardiology within 1-2 weeks      - Continue taking your prescription medications

## 2022-10-13 NOTE — DISCHARGE SUMMARY
INTERNAL MEDICINE RESIDENCY DISCHARGE SUMMARY     [place]   75 y o  female  MRN: [id number]  Room/Bed: MS 80/MS [address] MED SURG 5   Encounter: [id number]    Principal Problem:    Complete heart block [facility])  Active Problems:    Disease of thyroid gland    Hyperlipidemia    Hypertension    Transaminitis    Sleep apnea      Transaminitis  Assessment & Plan  Per history patient had elevated liver enzymes before due Steatosis of the liver and per her Cardiology notes was a workup with GI    - Unable to find information    - Down trending  Plan:   - Trend AST/ALT outpatient  Hypertension  Assessment & Plan  At home on Telmisartan 20 mg daily    - Not controlled  SBP in range of 140-170s  - Restarted losartan 25 mg daily   - Hydralazine 5 mg p r n  Q 6 hours for SBP > 180  - Monitor vitals  Hyperlipidemia  Assessment & Plan  Continue statin     Disease of thyroid gland  Assessment & Plan  Continue levothyroxine  TSH was 3 38    * Complete heart block (HCC)  Assessment & Plan  Presented to [facility] ER after witnessed syncopal episode and mechanical fall with LOC (less than a minute)  History of presyncopal episodes, dizziness and LBBB seen by cardiology and Neurology with negative workup  Not on any AV rosy blocking agents, no history of tick bites, no chest pain/agina  Per chart, symptomatic CHB during transfer to [facility] with HR 20-30 but on arrival NSR with HR 70-80    - EKG on 10/11/2022 showing complete heart block  - Echo on 10/20/2022 showing LVEF 55% with grade 1 diastolic dysfunction  - Nuclear stress test on 10/01/2020 reversible anterior defect with subsequent follow-up CTA showing mild CAD   - S/p PPM on 10/12/22  - on telemetry normal sinus rhythm  Plan:  EP recommendations appreciated  Avoid AV rosy blocking agents      Elevated lactic acid level-resolved as of 10/13/2022  Assessment & Plan  On admission, lactic acid 2 2  S/p IV hydration   - Repeat on 10/11/22 lactic acid 1 9  Improved  [address] COURSE     Kitty Ellis is a 75 y o  female with past medical history of CAD, presyncope, hypertension, hyperlipidemia, type 2 diabetes, JAMES  who initially presented to [facility] ER after a fall  She had a witnessed mechanical fall - tripped, fell on the curb and hit her head and chest with loss of consciousness for less than a minute  Reports she remembers the fall and she felt lightheaded,  nauseated and also experienced chest pain, shortness of breath and left knee pain after the fall  States she did not experience lightheaded/dizziness before the fall, was eating and drinking problem and was feeling perfectly fine before the fall  In the ED, trauma was negative and EKG showed complete heart block  Cardiology was consulted and recommended transfer to [facility] for EP evaluation      On arrival to [place], she was in normal sinus rhythm but during transfer to [place] had a symptomatic bradycardia between 20 and 30  She underwent ppm placement on 10/12/2022  She remained hemodynamically stable during the hospital course  Device interrogation was done by EP which showed appropriate device function and okay to discharge  She lives in [address] [place] [address], and follows cardiologist      Today, she feels good overall without any complaints  Denies any chest pain, shortness on breath, palpitation, lightheadedness, fever, cough and/or any other physical complaints  She has been ambulating by herself without any assistance and has not had recurrence of dizzy spells  She is clinically stable to be discharged to go home  Follow up:  - Follow-up with PCP within 1-2 weeks  - Follow-up with cardiology within 1-2 weeks  - Continue taking your prescription medications    Physical Exam  Vitals reviewed  Constitutional:       Appearance: Normal appearance  She is obese  HENT:      Head: Normocephalic     Cardiovascular:      Rate and Rhythm: Normal rate and regular rhythm  Pulses: Normal pulses  Heart sounds: Normal heart sounds  Pulmonary:      Effort: Pulmonary effort is normal       Breath sounds: Normal breath sounds  Abdominal:      General: Abdomen is flat  Bowel sounds are normal       Palpations: Abdomen is soft  Musculoskeletal:         General: Normal range of motion  Skin:     General: Skin is warm  Neurological:      General: No focal deficit present  Mental Status: She is alert and oriented to person, place, and time  DISCHARGE INFORMATION     PCP at Discharge: No PCP on file    Admitting Provider: Jossue Del Castillo DO  Admission Date: 10/11/2022    Discharge Provider: Megan Larios MD  Discharge Date: 10/13/22    Discharge Disposition: [address]  Discharge Condition: good  Discharge with Lines: no    Discharge Diet: cardiac diet  Activity Restrictions: none  Test Results Pending at Discharge: None    Discharge Diagnoses:  Principal Problem:    Complete heart block ([address] )  Active Problems:    Disease of thyroid gland    Hyperlipidemia    Hypertension    Transaminitis    Sleep apnea  Resolved Problems:    Elevated lactic acid level      Consulting Providers:  Cardiology    Diagnostic & Therapeutic Procedures Performed:  XR chest portable    Result Date: 10/13/2022  Impression: No acute cardiopulmonary findings  Workstation performed: [id number]     XR chest 2 views    Result Date: 10/13/2022  Impression: No acute cardiopulmonary disease  Stable appearing pacemaker  Workstation performed: [id number]     XR ribs with pa chest min 3 views LEFT    Result Date: 10/12/2022  Impression: No evidence of a rib fracture  Workstation performed: [id number]     XR knee 4+ vw left injury    Result Date: 10/12/2022  Impression: No acute osseous abnormality  Workstation performed: [id number]     CT head without contrast    Result Date: 10/11/2022  Impression: No acute intracranial abnormality   Workstation performed: [id number]       Code Status: Level 1 - Full Code  Advance Directive & Living Will: <no information>  Power of :    POLST:      Medications:  Current Discharge Medication List        Current Discharge Medication List        Current Discharge Medication List      CONTINUE these medications which have NOT CHANGED    Details   Cyanocobalamin 1000 MCG/ML KIT Take by mouth      levothyroxine 100 mcg tablet Take 100 mcg by mouth daily      pravastatin (PRAVACHOL) 20 mg tablet Take 20 mg by mouth daily      telmisartan (MICARDIS) 20 MG tablet Take 20 mg by mouth daily      venlafaxine (EFFEXOR-XR) 75 mg 24 hr capsule Take 1 capsule by mouth daily             Allergies:  No Known Allergies    FOLLOW-UP     PCP Outpatient Follow-up:  none required    Consulting Providers Follow-up:  yes  Cardiology     Active Issues Requiring Follow-up:   none    Discharge Statement:   I spent 45 minutes minutes discharging the patient  This time was spent on the day of discharge  I had direct contact with the patient on the day of discharge  Additional documentation is required if more than 30 minutes were spent on discharge  Portions of the record may have been created with voice recognition software  Occasional wrong word or "sound a like" substitutions may have occurred due to the inherent limitations of voice recognition software    Read the chart carefully and recognize, using context, where substitutions have occurred     ==  Paul [address] [facility]  Internal Medicine Resident PGY-1

## 2022-10-13 NOTE — DISCHARGE INSTRUCTIONS
Please refer to post pacemaker implantation discharge instructions and restrictions and your pacemaker booklet/temporary card  Keep incision dry for one week  Do not use lotions/powders/creams on incision  Leave outer bandage in place for 1 week - it is water proof, and as long as it is fully adhered to your skin you may shower with it  If it appears as though the bandage is coming off and/or there is any communication to the area of device incision, please then keep the whole area dry for the remaining week  After 1 week, please remove by pulling all edges away from the center of the bandage  No overhead reaching/pushing/pulling/lifting greater than 5-10lbs with left arm for six weeks  Please call the office if you notice redness, swelling, bleeding, or drainage from incision or if you develop fevers  AFTER PACEMAKER CARE:    If you have any questions, please call [phone] to speak with a nurse (8:30am-4pm, or [phone] after hours)  For appointments, please call [phone]  WHAT YOU SHOULD KNOW:   A pacemaker is a small, battery-powered device that is placed under your skin in your upper chest area with wires placed through a vein that lead directly into the heart  It helps regulate your heart rate and prevent your heart from beating too slowly  AFTER YOU LEAVE:     Medicines:     Pain medicine: You may need medicine to take away or decrease pain  Learn how to take your medicine  Ask what medicine and how much you should take  Be sure you know how, when, and how often to take it  Usually Over the counter pain medicine is sufficient to control pain (Acetominophen or Ibuprofen) Ask your doctor if you may take these  If this does not control your pain, narcotic pain killers may be prescribed, please call if you need prescription  Do not wait until the pain is severe before you take your medicine  Tell caregivers if your pain does not decrease      Pain medicine can make you dizzy or sleepy  Prevent falls by calling someone when you get out of bed or if you need help  Take your medicine as directed  Call your healthcare provider if you think your medicine is not helping or if you have side effects  Tell him if you are allergic to any medicine  Follow up with your cardiologist after your procedure: You will need a follow-up visit approximately 2 weeks after you leave the hospital  Your cardiologist will check your wound and make sure that your pacemaker is working correctly  Follow the instructions to check your pacemaker: Your cardiologist or primary healthcare provider will check your pacemaker and the battery regularly  He will use a computer to check your pacemaker over the telephone or wireless device which will be given to you  Pacemaker batteries usually last 8 to 10 years  The pacemaker unit will be replaced when the battery gets low  This is a simpler procedure than the original one to implant your pacemaker  Wound care:  Keep your incision dry for one week  Do not use lotions/powders/creams on incision  Leave outer bandage in place for 1 week - it is water proof, and as long as it is fully adhered to your skin you may shower with it  If it appears as though the bandage is coming off and/or there is any communication to the area of device incision, please then keep the whole area dry for the remaining week  After 1 week, please remove by pulling all edges away from the center of the bandage  Please call the office if you notice redness, swelling, bleeding, or drainage from incision or if you develop fevers  Activity:   Arm movement and lifting:  Be careful using the arm on the side of your pacemaker  Do not move your arm for the first 24 hours after your procedure  Do not  lift your arm above your shoulder or lift more than 10 pounds for one month after your procedure   Avoid pushing, pulling, or repetitive arm movements for one month  This helps the leads stay in place and helps your wound heal  Ask your caregiver when you can drive after your procedure  You may move your arm side to side without lifting above your shoulder, and do not need to wear a sling at home  Driving: you are ok to drive 48 hours after pacemaker is implanted   Sports:  Ask your caregiver when it is okay to play tennis, golf, basketball, or any sport that requires you to lift your arms  Do not play full contact sports, such as football, that could damage your pacemaker  Ask your cardiologist or primary healthcare provider how much and what kinds of physical activity are safe for you  Living with a pacemaker:   Tell all caregivers you have a pacemaker: This includes surgeons, radiologists, and medical technicians  You may want to wear a medical alert ID bracelet or necklace that states that you have a pacemaker  Carry your pacemaker ID card: Make sure you receive a pacemaker ID card  Carry it with you at all times  It lists important information about your pacemaker  Show it to airport security if you travel  Avoid electrical interference:  Avoid welding equipment and other equipment with large magnets or electric fields  These things could interfere with how your pacemaker works  Use your cell phone on the ear opposite from your pacemaker  Do not carry your cell phone in your shirt pocket over your chest      Some Pacemakers are MRI safe  Ask you doctor if it is safe to proceed with MRI and let the radiologist and staff know you have a pacemaker  Do not touch the skin around your pacemaker: This can cause damage to the lead wires or move the pacemaker unit from where it should be  Contact your cardiologist or primary healthcare provider if:   The area around your pacemaker has increasing amount of pain after surgery  The pain should improve over first few days after implantation       The skin around your stitches has increasing redness, swelling, or has drainage  This may mean that you have an infection  You have a fever  You have chills, a cough, and feel weak or achy  These are also signs of infection  Your feet or ankles are more swollen than your baseline  Your Heart rate is less than 50 beats per minute     Seek care immediately if:   Your bandage becomes soaked with blood  Your pacemaker is swelling rapidly    Your stitches open up  You feel your heart suddenly beating very slowly or quickly  You become too weak or dizzy to stand, or you pass out  Your arm or leg feels warm, tender, and painful  It may look swollen and red  You have chest pain that does not go away with rest or medicine  You feel lightheaded, short of breath, and have chest pain  You cough up blood  © 2014 [address] is for End User's use only and may not be sold, redistributed or otherwise used for commercial purposes  All illustrations and images included in CareNotes® are the copyrighted property of [employer]  or Joel Mccormack  The above information is an  only  It is not intended as medical advice for individual conditions or treatments  Talk to your doctor, nurse or pharmacist before following any medical regimen to see if it is safe and effective for you

## 2022-10-17 ENCOUNTER — EPISODE CHANGES (OUTPATIENT)
Dept: CASE MANAGEMENT | Facility: OTHER | Age: 67
End: 2022-10-17

## 2022-10-18 ENCOUNTER — PATIENT OUTREACH (OUTPATIENT)
Dept: CASE MANAGEMENT | Facility: HOSPITAL | Age: 67
End: 2022-10-18

## 2022-10-18 NOTE — PROGRESS NOTES
10/14/22  Inbasket notification received for patient enrolled in Medicare Bundle  10/18/22  Inbasket notification received indicating Final -Permanent Cardiac Pacemaker implant without CC/shelter  is non BPCI eligible     CM removed self from care team

## (undated) DEVICE — SLITTER ADJUSTABLE

## (undated) DEVICE — RADIFOCUS GLIDEWIRE: Brand: GLIDEWIRE

## (undated) DEVICE — CATH GUIDING FIXED SHAPE 43CM -NC

## (undated) DEVICE — INTRO SHEATH PEEL AWAY 9 FR

## (undated) DEVICE — INTRO SHEATH PEEL AWAY 7FR